# Patient Record
Sex: FEMALE | Race: WHITE | NOT HISPANIC OR LATINO | Employment: FULL TIME | URBAN - NONMETROPOLITAN AREA
[De-identification: names, ages, dates, MRNs, and addresses within clinical notes are randomized per-mention and may not be internally consistent; named-entity substitution may affect disease eponyms.]

---

## 2016-07-26 LAB — HM PAP SMEAR: NORMAL

## 2017-05-10 ENCOUNTER — OFFICE VISIT (OUTPATIENT)
Dept: OBSTETRICS AND GYNECOLOGY | Facility: CLINIC | Age: 32
End: 2017-05-10

## 2017-05-10 VITALS
WEIGHT: 149 LBS | DIASTOLIC BLOOD PRESSURE: 60 MMHG | HEIGHT: 72 IN | BODY MASS INDEX: 20.18 KG/M2 | SYSTOLIC BLOOD PRESSURE: 110 MMHG

## 2017-05-10 DIAGNOSIS — Z30.09 CONTRACEPTIVE EDUCATION: ICD-10-CM

## 2017-05-10 DIAGNOSIS — R10.2 PELVIC PAIN: Primary | ICD-10-CM

## 2017-05-10 DIAGNOSIS — Z87.410 HISTORY OF CERVICAL DYSPLASIA: ICD-10-CM

## 2017-05-10 PROCEDURE — 99213 OFFICE O/P EST LOW 20 MIN: CPT | Performed by: OBSTETRICS & GYNECOLOGY

## 2017-05-10 RX ORDER — FLUTICASONE PROPIONATE 50 MCG
2 SPRAY, SUSPENSION (ML) NASAL DAILY
COMMUNITY

## 2017-05-10 RX ORDER — CETIRIZINE HYDROCHLORIDE 10 MG/1
10 TABLET ORAL DAILY
COMMUNITY
End: 2020-04-15

## 2017-05-23 DIAGNOSIS — Z87.410 HISTORY OF CERVICAL DYSPLASIA: ICD-10-CM

## 2018-08-30 ENCOUNTER — OFFICE VISIT (OUTPATIENT)
Dept: OBSTETRICS AND GYNECOLOGY | Facility: CLINIC | Age: 33
End: 2018-08-30

## 2018-08-30 VITALS
HEIGHT: 71 IN | BODY MASS INDEX: 22.4 KG/M2 | WEIGHT: 160 LBS | DIASTOLIC BLOOD PRESSURE: 64 MMHG | SYSTOLIC BLOOD PRESSURE: 128 MMHG

## 2018-08-30 DIAGNOSIS — Z01.419 ENCOUNTER FOR GYNECOLOGICAL EXAMINATION WITHOUT ABNORMAL FINDING: Primary | ICD-10-CM

## 2018-08-30 PROCEDURE — 99395 PREV VISIT EST AGE 18-39: CPT | Performed by: OBSTETRICS & GYNECOLOGY

## 2018-08-30 RX ORDER — CEFUROXIME AXETIL 500 MG/1
TABLET ORAL
COMMUNITY
Start: 2018-08-22 | End: 2019-11-05

## 2018-08-30 NOTE — PROGRESS NOTES
Subjective  No chief complaint on file.    Patient is 32 y.o.  here for annual examination.  Pt had last pap last year.  Pt with history of cervical dysplasia s/p LEEP.  Pt has had only 1 pap smear since then.  Pt still not on any contraception.  Pt does not want to be at this time.  Menses are regular.  Pt with no pain or problems.    History  Past Medical History:   Diagnosis Date   • Abnormal Pap smear of cervix 2016    ASCUS HIGH RISK HPV (LIBRADO TAM, KRISTA)   • Abnormal Pap smear of cervix 2010    HIEU 1, LSIL MILD DYSPLASIA (DR ESPARZA)   • Asthma    • Dysplasia of cervix 2016    MODERATE   • History of colposcopy with cervical biopsy 2016    MODERATE DYSPLASIA HIEU 11, HSIL (DR ESPARZA)   • History of colposcopy with cervical biopsy 2010    MILD DYSPLASIA (DR ESPARZA)   • HPV (human papilloma virus) infection    • HSV-1 infection 2010    DR ESPARZA     Current Outpatient Prescriptions on File Prior to Visit   Medication Sig Dispense Refill   • cetirizine (zyrTEC) 10 MG tablet Take 10 mg by mouth Daily.     • fluticasone (FLONASE) 50 MCG/ACT nasal spray 2 sprays into each nostril Daily.     • MULTIPLE VITAMIN PO Take  by mouth.     • PROAIR  (90 BASE) MCG/ACT inhaler inhale 2 puffs by mouth every 6 hours if needed for cough or wheezing  0     No current facility-administered medications on file prior to visit.      No Known Allergies  Past Surgical History:   Procedure Laterality Date   • CERVICAL CONIZATION, LEEP  2016    MILD/MODERATE DYSPLASIA (DR ESPARZA)   • CRYOTHERAPY      DR MARIE     Family History   Problem Relation Age of Onset   • Hypertension Father    • Hyperlipidemia Father    • Breast cancer Mother    • Hypertension Mother    • Hyperlipidemia Mother    • Diabetes Maternal Grandmother      Social History     Social History   • Marital status: Single     Social History Main Topics   • Smoking status: Never Smoker   • Smokeless tobacco: Never Used   • Alcohol use  "No   • Drug use: No   • Sexual activity: Yes     Partners: Male     Birth control/ protection: None     Other Topics Concern   • Not on file     Review of Systems  The following systems were reviewed and negative:  constitution, eyes, ENT, respiratory, cardiovascular, gastrointestinal, genitourinary, integument, breast, hematologic / lymphatic, musculoskeletal, neurological, behavioral/psych, endocrine and allergies / immunologic     Objective  Vitals:    08/30/18 1158   BP: 128/64   Weight: 72.6 kg (160 lb)   Height: 180.3 cm (71\")     Physical Exam:  General Appearance: alert, appears stated age and cooperative  Head: normocephalic, without obvious abnormality and atraumatic  Eyes: lids and lashes normal, conjunctivae and sclerae normal, no icterus, no pallor, corneas clear and PERRLA  Ears: ears appear intact with no abnormalities noted  Nose: nares normal, septum midline, mucosa normal and no drainage  Neck: suppple, trachea midline and no thyromegaly  Lungs: clear to auscultation, respirations regular, respirations even and respirations unlabored  Heart: regular rhythm and normal rate, normal S1, S2, no murmur, gallop, or rubs and no click  Breasts: Examined in supine position  Symmetric without masses or skin dimpling  Nipples normal without inversion, lesions or discharge  There are no palpable axillary nodes  Abdomen: normal bowel sounds, no masses, no hepatomegaly, no splenomegaly, soft non-tender, no guarding and no rebound tenderness  Pelvic: Clinical staff was present for exam  External genitalia:  normal appearance of the external genitalia including Bartholin's and [place]'s glands.  :  urethral meatus normal;  Vaginal:  normal pink mucosa without prolapse or lesions.  Cervix:  normal appearance.  Uterus:  normal size, shape and consistency.  Adnexa:  normal bimanual exam of the adnexa.  Pap smear done and specimen sent using Thin-Prep technique  Extremities: moves extremities well, no edema, no " cyanosis and no redness  Skin: no bleeding, bruising or rash and no lesions noted  Lymph Nodes: no palpable adenopathy  Neuro: CN II-X grossly intact; sensation intact  Psych: normal mood and affect, oriented to person, time and place, thought content organized and appropriate judgment  Lab Review   pap    Imaging   No data reviewed    Assessment/Plan  Problem List Items Addressed This Visit     None      Visit Diagnoses     Encounter for gynecological examination without abnormal finding    -  Primary  Pap was done today.  If she does not receive the results of the Pap within 2 weeks  time, she was instructed to call to find out the results.  I explained to Bel that the recommendations for Pap smear interval in a low risk patient has lengthened to 3 years time if cytology alone normal or  5 years time if both cytology and HPV testing were normal.  I encouraged her to be seen yearly for a full physical exam including breast and pelvic exam even during the off years when PAP's will not be performed.  Pt instructed to call for results.  Plan pending results.    Relevant Orders    Pap IG, Rfx HPV ASCU        Follow up pending pathology   This note was electronically signed.  Lupe Ding M.D.

## 2018-09-12 DIAGNOSIS — Z01.419 ENCOUNTER FOR GYNECOLOGICAL EXAMINATION WITHOUT ABNORMAL FINDING: ICD-10-CM

## 2019-11-05 ENCOUNTER — OFFICE VISIT (OUTPATIENT)
Dept: INTERNAL MEDICINE | Facility: CLINIC | Age: 34
End: 2019-11-05

## 2019-11-05 VITALS
WEIGHT: 160 LBS | TEMPERATURE: 98.6 F | SYSTOLIC BLOOD PRESSURE: 125 MMHG | HEIGHT: 72 IN | DIASTOLIC BLOOD PRESSURE: 79 MMHG | OXYGEN SATURATION: 99 % | BODY MASS INDEX: 21.67 KG/M2 | RESPIRATION RATE: 16 BRPM | HEART RATE: 68 BPM

## 2019-11-05 DIAGNOSIS — J45.20 MILD INTERMITTENT ASTHMA WITHOUT COMPLICATION: ICD-10-CM

## 2019-11-05 DIAGNOSIS — R73.01 IMPAIRED FASTING GLUCOSE: ICD-10-CM

## 2019-11-05 DIAGNOSIS — E53.8 VITAMIN B12 DEFICIENCY: ICD-10-CM

## 2019-11-05 DIAGNOSIS — E55.9 VITAMIN D DEFICIENCY: ICD-10-CM

## 2019-11-05 DIAGNOSIS — E78.2 MIXED HYPERLIPIDEMIA: Primary | ICD-10-CM

## 2019-11-05 DIAGNOSIS — R53.83 MALAISE AND FATIGUE: ICD-10-CM

## 2019-11-05 DIAGNOSIS — Z23 NEED FOR HEPATITIS VACCINATION: ICD-10-CM

## 2019-11-05 DIAGNOSIS — R53.81 MALAISE AND FATIGUE: ICD-10-CM

## 2019-11-05 PROCEDURE — 99204 OFFICE O/P NEW MOD 45 MIN: CPT | Performed by: INTERNAL MEDICINE

## 2019-11-05 RX ORDER — NORGESTIMATE AND ETHINYL ESTRADIOL
1 KIT DAILY
COMMUNITY
Start: 2019-10-18 | End: 2020-01-07

## 2019-11-05 RX ORDER — LAMOTRIGINE 200 MG/1
1 TABLET ORAL DAILY
COMMUNITY
Start: 2019-10-16 | End: 2020-07-09

## 2019-11-05 RX ORDER — MONTELUKAST SODIUM 10 MG/1
1 TABLET ORAL DAILY
COMMUNITY
Start: 2019-10-22 | End: 2019-11-05 | Stop reason: SDUPTHER

## 2019-11-05 RX ORDER — HYDROXYZINE 50 MG/1
50 TABLET, FILM COATED ORAL EVERY 6 HOURS PRN
COMMUNITY
End: 2020-04-22 | Stop reason: ALTCHOICE

## 2019-11-05 RX ORDER — MONTELUKAST SODIUM 10 MG/1
10 TABLET ORAL DAILY
Qty: 90 TABLET | Refills: 3 | Status: SHIPPED | OUTPATIENT
Start: 2019-11-05 | End: 2020-10-29 | Stop reason: SDUPTHER

## 2019-11-05 RX ORDER — CLONIDINE HYDROCHLORIDE 0.1 MG/1
1 TABLET ORAL 2 TIMES DAILY PRN
COMMUNITY
Start: 2019-09-26 | End: 2020-09-08

## 2019-11-05 NOTE — PROGRESS NOTES
Sent reminder letter out to patient for Hepatitis C Genotype and HCV RNA By PCR, Qn Rfx Jeanie tests ordered by Nico on 6/13/18. Will follow-up on or by 7/4/18. Lara Bernard MA 06/20/18    Subjective   Bel Brown is a 34 y.o. female.     Chief Complaint   Patient presents with   • Establish Care   • Hyperlipidemia   • Blood Sugar Problem   • Asthma       History of Present Illness   Patient is here for initial visit, she is here to follow-up on her cholesterol and sugar and due for she also complains of asthma and is currently using Advair she had to be seen at the blood work, urgent care recently, she also complains of fatigue, she is due for hepatitis A vaccination, she is a current smoker    Review of Systems   Constitutional: Negative for appetite change, fatigue and fever.   HENT: Negative for congestion, ear discharge, ear pain, sinus pressure and sore throat.    Eyes: Negative for pain and discharge.   Respiratory: Negative for cough, chest tightness, shortness of breath and wheezing.    Cardiovascular: Negative for chest pain, palpitations and leg swelling.   Gastrointestinal: Negative for abdominal pain, blood in stool, constipation, diarrhea and nausea.   Endocrine: Negative for cold intolerance and heat intolerance.   Genitourinary: Negative for dysuria, flank pain and frequency.   Musculoskeletal: Negative for back pain and joint swelling.   Skin: Negative for color change.   Allergic/Immunologic: Negative for environmental allergies and food allergies.   Neurological: Negative for dizziness, weakness, numbness and headaches.   Hematological: Negative for adenopathy. Does not bruise/bleed easily.   Psychiatric/Behavioral: Negative for behavioral problems and dysphoric mood. The patient is not nervous/anxious.        Past Medical History:   Diagnosis Date   • Abnormal Pap smear of cervix 07/26/2016    ASCUS HIGH RISK HPV (LIBRADO TAM, KRISTA)   • Abnormal Pap smear of cervix 01/27/2010    HIEU 1, LSIL MILD DYSPLASIA (DR ESPARZA)   • Asthma    • Depression    • Dysplasia of cervix 08/2016    MODERATE   • KIYA (generalized anxiety disorder)    • History of colposcopy with cervical biopsy 08/24/2016     MODERATE DYSPLASIA HIEU 11, HSIL (DR ESPARZA)   • History of colposcopy with cervical biopsy 03/17/2010    MILD DYSPLASIA (DR ESPARZA)   • HPV (human papilloma virus) infection    • 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    DR ESPARZA   • Panic disorder        Past Surgical History:   Procedure Laterality Date   • CERVICAL CONIZATION, LEEP  09/28/2016    MILD/MODERATE DYSPLASIA (DR ESPARZA)   • CRYOTHERAPY  2013    DR MARIE   • EYE SURGERY  2002       Family History   Problem Relation Age of Onset   • Hypertension Father    • Hyperlipidemia Father    • Cancer Father         skin   • Breast cancer Mother    • Hypertension Mother    • Hyperlipidemia Mother    • Cancer Mother         breast  and  skin   • Diabetes Maternal Grandmother    • Mental illness Maternal Grandmother    • Diabetes Maternal Uncle    • Cancer Paternal Grandmother         colon        reports that she has been smoking.  She has been smoking about 0.05 packs per day. She has never used smokeless tobacco. She reports that she drinks alcohol. She reports that she does not use drugs.    No Known Allergies        Current Outpatient Medications:   •  ADVAIR DISKUS 250-50 MCG/DOSE DISKUS, Inhale 1 puff 2 (Two) Times a Day., Disp: 3 each, Rfl: 6  •  cetirizine (zyrTEC) 10 MG tablet, Take 10 mg by mouth Daily., Disp: , Rfl:   •  cloNIDine (CATAPRES) 0.1 MG tablet, Take 1 tablet by mouth 2 (Two) Times a Day As Needed., Disp: , Rfl:   •  fluticasone (FLONASE) 50 MCG/ACT nasal spray, 2 sprays into each nostril Daily., Disp: , Rfl:   •  hydrOXYzine (ATARAX) 50 MG tablet, Take 50 mg by mouth Every 6 (Six) Hours As Needed for Itching., Disp: , Rfl:   •  lamoTRIgine (LaMICtal) 200 MG tablet, Take 1 tablet by mouth Daily., Disp: , Rfl:   •  montelukast (SINGULAIR) 10 MG tablet, Take 1 tablet by mouth Daily., Disp: 90 tablet, Rfl: 3  •  MULTIPLE VITAMIN PO, Take  by mouth., Disp: , Rfl:   •  PREVIFEM 0.25-35 MG-MCG per tablet, Take 1 tablet by mouth Daily., Disp: , Rfl:   •  PROAIR HFA  "108 (90 BASE) MCG/ACT inhaler, inhale 2 puffs by mouth every 6 hours if needed for cough or wheezing, Disp: , Rfl: 0  •  vitamin D (ERGOCALCIFEROL) 1.25 MG (05125 UT) capsule capsule, Take 1 capsule by mouth 1 (One) Time Per Week., Disp: 8 capsule, Rfl: 0      Objective   Blood pressure 125/79, pulse 68, temperature 98.6 °F (37 °C), resp. rate 16, height 183.5 cm (72.25\"), weight 72.6 kg (160 lb), SpO2 99 %, not currently breastfeeding.    Physical Exam   Constitutional: She is oriented to person, place, and time. She appears well-developed and well-nourished. No distress.   HENT:   Head: Normocephalic and atraumatic.   Right Ear: External ear normal.   Left Ear: External ear normal.   Nose: Nose normal.   Mouth/Throat: Oropharynx is clear and moist.   Eyes: Conjunctivae and EOM are normal. Pupils are equal, round, and reactive to light.   Neck: Neck supple. No thyromegaly present.   Cardiovascular: Normal rate, regular rhythm and normal heart sounds.   Pulmonary/Chest: Effort normal and breath sounds normal. No respiratory distress.   Abdominal: Soft. Bowel sounds are normal. She exhibits no distension. There is no tenderness. There is no rebound.   Musculoskeletal: Normal range of motion. She exhibits no edema.   Lymphadenopathy:     She has no cervical adenopathy.   Neurological: She is alert and oriented to person, place, and time.   No gross motor or sensory deficits   Skin: Skin is warm. She is not diaphoretic.   Psychiatric: She has a normal mood and affect.   Nursing note and vitals reviewed.      Patient's Body mass index is 21.55 kg/m².       Results for orders placed or performed in visit on 11/05/19   Comprehensive Metabolic Panel   Result Value Ref Range    Glucose 90 65 - 99 mg/dL    BUN 6 6 - 20 mg/dL    Creatinine 0.69 0.57 - 1.00 mg/dL    eGFR Non African Am 97 >60 mL/min/1.73    eGFR African Am 118 >60 mL/min/1.73    BUN/Creatinine Ratio 8.7 7.0 - 25.0    Sodium 140 136 - 145 mmol/L    Potassium " 4.0 3.5 - 5.2 mmol/L    Chloride 101 98 - 107 mmol/L    Total CO2 27.3 22.0 - 29.0 mmol/L    Calcium 9.5 8.6 - 10.5 mg/dL    Total Protein 6.8 6.0 - 8.5 g/dL    Albumin 4.60 3.50 - 5.20 g/dL    Globulin 2.2 gm/dL    A/G Ratio 2.1 g/dL    Total Bilirubin 0.4 0.2 - 1.2 mg/dL    Alkaline Phosphatase 54 39 - 117 U/L    AST (SGOT) 17 1 - 32 U/L    ALT (SGPT) 11 1 - 33 U/L   Lipid Panel   Result Value Ref Range    Total Cholesterol 190 0 - 200 mg/dL    Triglycerides 93 0 - 150 mg/dL    HDL Cholesterol 82 (H) 40 - 60 mg/dL    VLDL Cholesterol 18.6 mg/dL    LDL Cholesterol  89 0 - 100 mg/dL   Hemoglobin A1c   Result Value Ref Range    Hemoglobin A1C 4.50 (L) 4.80 - 5.60 %   TSH   Result Value Ref Range    TSH 1.670 0.270 - 4.200 uIU/mL   Vitamin B12   Result Value Ref Range    Vitamin B-12 589 211 - 946 pg/mL   Vitamin D 25 Hydroxy   Result Value Ref Range    25 Hydroxy, Vitamin D 27.8 (L) 30.0 - 100.0 ng/ml   CBC & Differential   Result Value Ref Range    WBC 5.73 3.40 - 10.80 10*3/mm3    RBC 4.15 3.77 - 5.28 10*6/mm3    Hemoglobin 13.1 12.0 - 15.9 g/dL    Hematocrit 39.2 34.0 - 46.6 %    MCV 94.5 79.0 - 97.0 fL    MCH 31.6 26.6 - 33.0 pg    MCHC 33.4 31.5 - 35.7 g/dL    RDW 11.8 (L) 12.3 - 15.4 %    Platelets 234 140 - 450 10*3/mm3    Neutrophil Rel % 51.2 42.7 - 76.0 %    Lymphocyte Rel % 37.7 19.6 - 45.3 %    Monocyte Rel % 8.6 5.0 - 12.0 %    Eosinophil Rel % 1.6 0.3 - 6.2 %    Basophil Rel % 0.7 0.0 - 1.5 %    Neutrophils Absolute 2.94 1.70 - 7.00 10*3/mm3    Lymphocytes Absolute 2.16 0.70 - 3.10 10*3/mm3    Monocytes Absolute 0.49 0.10 - 0.90 10*3/mm3    Eosinophils Absolute 0.09 0.00 - 0.40 10*3/mm3    Basophils Absolute 0.04 0.00 - 0.20 10*3/mm3    Immature Granulocyte Rel % 0.2 0.0 - 0.5 %    Immature Grans Absolute 0.01 0.00 - 0.05 10*3/mm3    nRBC 0.0 0.0 - 0.2 /100 WBC         Assessment/Plan   Bel was seen today for establish care, hyperlipidemia, blood sugar problem and asthma.    Diagnoses and all orders  for this visit:    Mixed hyperlipidemia  -     CBC & Differential  -     Comprehensive Metabolic Panel  -     Lipid Panel    Impaired fasting glucose  -     Hemoglobin A1c    Mild intermittent asthma without complication    Malaise and fatigue  -     TSH    Vitamin B12 deficiency  -     Vitamin B12    Vitamin D deficiency  -     Vitamin D 25 Hydroxy    Need for hepatitis vaccination  -     hepatitis A (HAVRIX) vaccine 1,440 Units    Other orders  -     montelukast (SINGULAIR) 10 MG tablet; Take 1 tablet by mouth Daily.  -     ADVAIR DISKUS 250-50 MCG/DOSE DISKUS; Inhale 1 puff 2 (Two) Times a Day.    Plan:  1.   mixed hyperlipidemia: will obtain   fasting CMP and lipid panel.  Diet and exercise counseled,    2.  Impaired glucose: will obtain   fasting CMP  and hba1c  , diet and exercise counseled ,   3.  Asthma: We will continue with Advair, trial with Singulair  4.fatigue: We will obtain labs  5.need for hepatitis A vaccine: First dose given in office today         Lupe Walton MD

## 2019-11-06 LAB
25(OH)D3+25(OH)D2 SERPL-MCNC: 27.8 NG/ML (ref 30–100)
ALBUMIN SERPL-MCNC: 4.6 G/DL (ref 3.5–5.2)
ALBUMIN/GLOB SERPL: 2.1 G/DL
ALP SERPL-CCNC: 54 U/L (ref 39–117)
ALT SERPL-CCNC: 11 U/L (ref 1–33)
AST SERPL-CCNC: 17 U/L (ref 1–32)
BASOPHILS # BLD AUTO: 0.04 10*3/MM3 (ref 0–0.2)
BASOPHILS NFR BLD AUTO: 0.7 % (ref 0–1.5)
BILIRUB SERPL-MCNC: 0.4 MG/DL (ref 0.2–1.2)
BUN SERPL-MCNC: 6 MG/DL (ref 6–20)
BUN/CREAT SERPL: 8.7 (ref 7–25)
CALCIUM SERPL-MCNC: 9.5 MG/DL (ref 8.6–10.5)
CHLORIDE SERPL-SCNC: 101 MMOL/L (ref 98–107)
CHOLEST SERPL-MCNC: 190 MG/DL (ref 0–200)
CO2 SERPL-SCNC: 27.3 MMOL/L (ref 22–29)
CREAT SERPL-MCNC: 0.69 MG/DL (ref 0.57–1)
EOSINOPHIL # BLD AUTO: 0.09 10*3/MM3 (ref 0–0.4)
EOSINOPHIL NFR BLD AUTO: 1.6 % (ref 0.3–6.2)
ERYTHROCYTE [DISTWIDTH] IN BLOOD BY AUTOMATED COUNT: 11.8 % (ref 12.3–15.4)
GLOBULIN SER CALC-MCNC: 2.2 GM/DL
GLUCOSE SERPL-MCNC: 90 MG/DL (ref 65–99)
HBA1C MFR BLD: 4.5 % (ref 4.8–5.6)
HCT VFR BLD AUTO: 39.2 % (ref 34–46.6)
HDLC SERPL-MCNC: 82 MG/DL (ref 40–60)
HGB BLD-MCNC: 13.1 G/DL (ref 12–15.9)
IMM GRANULOCYTES # BLD AUTO: 0.01 10*3/MM3 (ref 0–0.05)
IMM GRANULOCYTES NFR BLD AUTO: 0.2 % (ref 0–0.5)
LDLC SERPL CALC-MCNC: 89 MG/DL (ref 0–100)
LYMPHOCYTES # BLD AUTO: 2.16 10*3/MM3 (ref 0.7–3.1)
LYMPHOCYTES NFR BLD AUTO: 37.7 % (ref 19.6–45.3)
MCH RBC QN AUTO: 31.6 PG (ref 26.6–33)
MCHC RBC AUTO-ENTMCNC: 33.4 G/DL (ref 31.5–35.7)
MCV RBC AUTO: 94.5 FL (ref 79–97)
MONOCYTES # BLD AUTO: 0.49 10*3/MM3 (ref 0.1–0.9)
MONOCYTES NFR BLD AUTO: 8.6 % (ref 5–12)
NEUTROPHILS # BLD AUTO: 2.94 10*3/MM3 (ref 1.7–7)
NEUTROPHILS NFR BLD AUTO: 51.2 % (ref 42.7–76)
NRBC BLD AUTO-RTO: 0 /100 WBC (ref 0–0.2)
PLATELET # BLD AUTO: 234 10*3/MM3 (ref 140–450)
POTASSIUM SERPL-SCNC: 4 MMOL/L (ref 3.5–5.2)
PROT SERPL-MCNC: 6.8 G/DL (ref 6–8.5)
RBC # BLD AUTO: 4.15 10*6/MM3 (ref 3.77–5.28)
SODIUM SERPL-SCNC: 140 MMOL/L (ref 136–145)
TRIGL SERPL-MCNC: 93 MG/DL (ref 0–150)
TSH SERPL DL<=0.005 MIU/L-ACNC: 1.67 UIU/ML (ref 0.27–4.2)
VIT B12 SERPL-MCNC: 589 PG/ML (ref 211–946)
VLDLC SERPL CALC-MCNC: 18.6 MG/DL
WBC # BLD AUTO: 5.73 10*3/MM3 (ref 3.4–10.8)

## 2019-11-06 RX ORDER — ERGOCALCIFEROL 1.25 MG/1
50000 CAPSULE ORAL WEEKLY
Qty: 8 CAPSULE | Refills: 0 | Status: SHIPPED | OUTPATIENT
Start: 2019-11-06 | End: 2020-04-15

## 2020-01-06 ENCOUNTER — TELEPHONE (OUTPATIENT)
Dept: INTERNAL MEDICINE | Facility: CLINIC | Age: 35
End: 2020-01-06

## 2020-01-07 ENCOUNTER — OFFICE VISIT (OUTPATIENT)
Dept: OBSTETRICS AND GYNECOLOGY | Facility: CLINIC | Age: 35
End: 2020-01-07

## 2020-01-07 VITALS
DIASTOLIC BLOOD PRESSURE: 80 MMHG | WEIGHT: 159 LBS | BODY MASS INDEX: 21.54 KG/M2 | SYSTOLIC BLOOD PRESSURE: 128 MMHG | HEIGHT: 72 IN

## 2020-01-07 DIAGNOSIS — R93.5 ABNORMAL ULTRASOUND OF ENDOMETRIUM: ICD-10-CM

## 2020-01-07 DIAGNOSIS — N94.10 DYSPAREUNIA IN FEMALE: ICD-10-CM

## 2020-01-07 DIAGNOSIS — N94.6 DYSMENORRHEA: ICD-10-CM

## 2020-01-07 DIAGNOSIS — R10.2 PELVIC PAIN: Primary | ICD-10-CM

## 2020-01-07 DIAGNOSIS — N83.201 RIGHT OVARIAN CYST: ICD-10-CM

## 2020-01-07 PROCEDURE — 99214 OFFICE O/P EST MOD 30 MIN: CPT | Performed by: OBSTETRICS & GYNECOLOGY

## 2020-01-07 RX ORDER — DOXYCYCLINE HYCLATE 100 MG/1
100 CAPSULE ORAL 2 TIMES DAILY
Qty: 20 CAPSULE | Refills: 0 | Status: SHIPPED | OUTPATIENT
Start: 2020-01-07 | End: 2020-01-17

## 2020-01-07 NOTE — PROGRESS NOTES
Subjective  Chief Complaint   Patient presents with   • Pelvic Pain     Patient complains of pelvic pain and painful intercourse.      Patient is 34 y.o.  here for evaluation of pelvic pain as well as dyspareunia.  Patient gives a history of having the onset of dyspareunia in May of last year.  Patient reports having continued pain since that time.  Patient reports it is been more frequent in nature over the last few months.  Patient reports having pain at other times as well.  Pt will have pain postcoital too.  Patient describes the pain as an intense cramping pelvic pain.  The patient denies any postcoital bleeding.  Patient denies any vaginal discharge.  The patient's last menstrual cycle was 2 weeks ago.  The patient had tried to restart oral contraceptives several months ago but reports she was unable to tolerate them secondary to nausea.  Patient has had nausea in the past with oral contraceptives.  Patient is currently not on any contraception.  Patient reports her last menstrual cycle was 2 weeks ago.  Patient has had a previous LEEP procedure in .  Patient had her last Pap smear in 2018 which was normal.  Patient reports her menstrual cycles have been regular.  Patient does report having cramping associated with her menstrual cycles as well.    History  Past Medical History:   Diagnosis Date   • Abnormal Pap smear of cervix 2016    ASCUS HIGH RISK HPV (LIBRADO TAM NP)   • Abnormal Pap smear of cervix 2010    HIEU 1, LSIL MILD DYSPLASIA (DR ESPARZA)   • Asthma    • Depression    • Dysplasia of cervix 2016    MODERATE   • KIYA (generalized anxiety disorder)    • History of colposcopy with cervical biopsy 2016    MODERATE DYSPLASIA HIEU 11, HSIL (DR ESPARZA)   • History of colposcopy with cervical biopsy 2010    MILD DYSPLASIA (DR ESPARZA)   • HPV (human papilloma virus) infection    • HSV-1 infection 2010    DR ESPARZA   • Panic disorder      Current Outpatient Medications on  File Prior to Visit   Medication Sig Dispense Refill   • ADVAIR DISKUS 250-50 MCG/DOSE DISKUS Inhale 1 puff 2 (Two) Times a Day. 3 each 11   • cetirizine (zyrTEC) 10 MG tablet Take 10 mg by mouth Daily.     • cloNIDine (CATAPRES) 0.1 MG tablet Take 1 tablet by mouth 2 (Two) Times a Day As Needed.     • fluticasone (FLONASE) 50 MCG/ACT nasal spray 2 sprays into each nostril Daily.     • hydrOXYzine (ATARAX) 50 MG tablet Take 50 mg by mouth Every 6 (Six) Hours As Needed for Itching.     • lamoTRIgine (LaMICtal) 200 MG tablet Take 1 tablet by mouth Daily.     • montelukast (SINGULAIR) 10 MG tablet Take 1 tablet by mouth Daily. 90 tablet 3   • MULTIPLE VITAMIN PO Take  by mouth.     • PROAIR  (90 BASE) MCG/ACT inhaler inhale 2 puffs by mouth every 6 hours if needed for cough or wheezing  0   • vitamin D (ERGOCALCIFEROL) 1.25 MG (25804 UT) capsule capsule Take 1 capsule by mouth 1 (One) Time Per Week. 8 capsule 0     No current facility-administered medications on file prior to visit.      No Known Allergies  Past Surgical History:   Procedure Laterality Date   • CERVICAL CONIZATION, LEEP  09/28/2016    MILD/MODERATE DYSPLASIA (DR ESPARZA)   • CRYOTHERAPY  2013    DR MARIE   • EYE SURGERY  2002     Family History   Problem Relation Age of Onset   • Hypertension Father    • Hyperlipidemia Father    • Cancer Father         skin   • Breast cancer Mother    • Hypertension Mother    • Hyperlipidemia Mother    • Cancer Mother         breast  and  skin   • Diabetes Maternal Grandmother    • Mental illness Maternal Grandmother    • Diabetes Maternal Uncle    • Cancer Paternal Grandmother         colon     Social History     Socioeconomic History   • Marital status: Single     Spouse name: Not on file   • Number of children: Not on file   • Years of education: Not on file   • Highest education level: Not on file   Tobacco Use   • Smoking status: Current Every Day Smoker     Packs/day: 0.05   • Smokeless tobacco: Never Used  "  Substance and Sexual Activity   • Alcohol use: Yes     Comment: social   • Drug use: No   • Sexual activity: Yes     Partners: Male     Birth control/protection: None     Review of Systems  All systems were reviewed and negative except for:  Genitourinary: postivie for  painful intercourse and pelvic pain     Objective  Vitals:    20 1447   BP: 128/80   Weight: 72.1 kg (159 lb)   Height: 183.5 cm (72.25\")     Physical Exam:  General Appearance: alert, appears stated age and cooperative  Head: normocephalic, without obvious abnormality and atraumatic  Eyes: lids and lashes normal, conjunctivae and sclerae normal, no icterus, no pallor, corneas clear and PERRLA  Ears: ears appear intact with no abnormalities noted  Nose: nares normal, septum midline, mucosa normal and no drainage  Neck: suppple, trachea midline and no thyromegaly  Lungs: clear to auscultation, respirations regular, respirations even and respirations unlabored  Heart: regular rhythm and normal rate, normal S1, S2, no murmur, gallop, or rubs and no click  Breasts: Not performed.  Abdomen: normal bowel sounds, no masses, no hepatomegaly, no splenomegaly, soft non-tender, no guarding and no rebound tenderness  Pelvic: Not performed.  Extremities: moves extremities well, no edema, no cyanosis and no redness  Skin: no bleeding, bruising or rash and no lesions noted  Lymph Nodes: no palpable adenopathy  Neuro: CN II-X grossly intact; sensation intact  Psych: normal mood and affect, oriented to person, time and place, thought content organized and appropriate judgment  Lab Review   No data reviewed    Imaging   Pelvic ultrasound report  Pelvic ultrasound images independantly reviewed; see report as noted    US Non-ob Transvaginal  Bel INGRID Brown  : 1985  MRN: [id number]  Date: 2020    Reason for exam/History:  Pelvic pain    The ultrasound images are reviewed.  The uterus is retroverted in   position.  The uterus appears normal.  There " is a small amount of fluid   noted in the endometrium.  The endometrium measures 11 mms.  The bilateral   ovaries are abnormal in appearance.  The left ovary has small follicles.    The right ovary has a 2.6 cm complex cyst noted.  There is normal vascular   flow noted.  There is scant free fluid noted.    The exam limitations noted:  none    See ultrasound report for measurements and structures identified.    Lupe Ding MD, [facility]  [facility] [place]    Decision to Obtain Medical Records  No    Summary of Medical Records  No    Assessment/Plan  Problem List Items Addressed This Visit     None      Visit Diagnoses     Pelvic pain    -  Primary New  A transvaginal ultrasound was performed today.  The patient's last menstrual cycle was 2 weeks ago.  There is fluid noted in the endometrium.  Patient also has a complex cyst of the right ovary.  A prescription is given for doxycycline as noted.  We will plan empiric treatment for endometritis.  Patient is to follow-up as noted.  Plan pending response to treatment.    Relevant Orders    US Non-ob Transvaginal (Completed)    Dysmenorrhea    New  Patient with dysmenorrhea.  The various options for treatment have been discussed.  Patient has not been able to tolerate oral contraceptives in the past.  Other alternatives have been discussed.  Patient is to follow-up as noted.    Dyspareunia in female    New  Patient with new onset dyspareunia as noted.  Transvaginal ultrasound shows fluid in the endometrium.  Prescriptions given for doxycycline.  Patient is to follow-up as noted.  Plan pending response to treatment.    Abnormal ultrasound of endometrium    New  Patient with fluid in the endometrium is noted today.  Patient is not bleeding.  Patient is to call if she starts her menstrual cycle.  Otherwise patient is to follow-up as noted.    Right ovarian cyst    New  With small complex cyst of the right ovary is noted.  Instructions and precautions are  given.  Patient may repeat scan in 6 to 8 weeks if desired as discussed.        Follow up as discussed/scheduled  Note: Speech recognition transcription software may have been used to dictate portions of this document.  An attempt at proofreading has been made though minor errors in transcription may still be present.  This note was electronically signed.  Lupe Ding M.D.

## 2020-01-29 ENCOUNTER — OFFICE VISIT (OUTPATIENT)
Dept: OBSTETRICS AND GYNECOLOGY | Facility: CLINIC | Age: 35
End: 2020-01-29

## 2020-01-29 VITALS
HEIGHT: 72 IN | DIASTOLIC BLOOD PRESSURE: 62 MMHG | WEIGHT: 162 LBS | BODY MASS INDEX: 21.94 KG/M2 | SYSTOLIC BLOOD PRESSURE: 124 MMHG

## 2020-01-29 DIAGNOSIS — N83.201 CYST OF RIGHT OVARY: ICD-10-CM

## 2020-01-29 DIAGNOSIS — R93.5 ABNORMAL ULTRASOUND OF ENDOMETRIUM: ICD-10-CM

## 2020-01-29 DIAGNOSIS — N94.10 DYSPAREUNIA IN FEMALE: ICD-10-CM

## 2020-01-29 DIAGNOSIS — R10.2 PELVIC PAIN: Primary | ICD-10-CM

## 2020-01-29 DIAGNOSIS — N94.6 DYSMENORRHEA: ICD-10-CM

## 2020-01-29 PROCEDURE — 99214 OFFICE O/P EST MOD 30 MIN: CPT | Performed by: OBSTETRICS & GYNECOLOGY

## 2020-01-29 RX ORDER — NORGESTIMATE AND ETHINYL ESTRADIOL
KIT
COMMUNITY
Start: 2020-01-10 | End: 2020-09-08

## 2020-04-09 ENCOUNTER — TELEPHONE (OUTPATIENT)
Dept: INTERNAL MEDICINE | Facility: CLINIC | Age: 35
End: 2020-04-09

## 2020-04-09 NOTE — TELEPHONE ENCOUNTER
Patient is requesting a call back regarding [place] paperwork that she needs to have filled out for work because she is currently on quarentine for being exposed to covid. Gave her the fax number for office so she will be faxing the paperwork over. Please advise.    Patient can be reached on her home number. [phone]

## 2020-04-09 NOTE — TELEPHONE ENCOUNTER
SHAR  Spoke with Bel, advised her that we could not do FMLA paperwork because she was not seen in our office, she needs to contact whoever put her in  quarantine, pt verbalized understanding

## 2020-04-15 ENCOUNTER — TELEMEDICINE (OUTPATIENT)
Dept: INTERNAL MEDICINE | Facility: CLINIC | Age: 35
End: 2020-04-15

## 2020-04-15 DIAGNOSIS — J45.21 MILD INTERMITTENT ASTHMA WITH ACUTE EXACERBATION: Primary | ICD-10-CM

## 2020-04-15 DIAGNOSIS — R06.02 SHORTNESS OF BREATH: ICD-10-CM

## 2020-04-15 DIAGNOSIS — J20.9 ACUTE BRONCHITIS WITH BRONCHOSPASM: Primary | ICD-10-CM

## 2020-04-15 DIAGNOSIS — J30.1 SEASONAL ALLERGIC RHINITIS DUE TO POLLEN: ICD-10-CM

## 2020-04-15 PROCEDURE — 99214 OFFICE O/P EST MOD 30 MIN: CPT | Performed by: INTERNAL MEDICINE

## 2020-04-15 RX ORDER — IPRATROPIUM BROMIDE AND ALBUTEROL SULFATE 2.5; .5 MG/3ML; MG/3ML
3 SOLUTION RESPIRATORY (INHALATION) EVERY 4 HOURS PRN
Qty: 360 ML | Refills: 5 | Status: SHIPPED | OUTPATIENT
Start: 2020-04-15 | End: 2021-06-09

## 2020-04-15 RX ORDER — AZITHROMYCIN 250 MG/1
TABLET, FILM COATED ORAL
Qty: 6 TABLET | Refills: 0 | Status: SHIPPED | OUTPATIENT
Start: 2020-04-15 | End: 2020-04-22 | Stop reason: ALTCHOICE

## 2020-04-15 RX ORDER — METHYLPREDNISOLONE 4 MG/1
TABLET ORAL
Qty: 21 TABLET | Refills: 0 | Status: SHIPPED | OUTPATIENT
Start: 2020-04-15 | End: 2020-04-22

## 2020-04-15 RX ORDER — BENZONATATE 100 MG/1
100 CAPSULE ORAL 3 TIMES DAILY PRN
Qty: 30 CAPSULE | Refills: 3 | Status: SHIPPED | OUTPATIENT
Start: 2020-04-15 | End: 2020-04-25

## 2020-04-15 RX ORDER — LEVOCETIRIZINE DIHYDROCHLORIDE 5 MG/1
5 TABLET, FILM COATED ORAL EVERY EVENING
Qty: 90 TABLET | Refills: 3 | Status: SHIPPED | OUTPATIENT
Start: 2020-04-15 | End: 2021-03-05

## 2020-04-15 NOTE — PROGRESS NOTES
Subjective   Bel Brown is a 34 y.o. female.     Chief Complaint   Patient presents with   • Cough   • Asthma   • Wheezing   • Allergies   • Shortness of Breath       History of Present Illness   Patient is complaining of cough and wheezing, she has asthma, patient also complains of shortness of breath, she is a current smoker but has not been smoking for the past few days, she is currently on quarantine until April 21, 2020 as she was exposed to a Covid positive patient at work but her covid results were negative, patient denies fever, patient also complains of allergies    The following portions of the patient's history were reviewed and updated as appropriate: allergies, current medications, past family history, past medical history, past social history, past surgical history and problem list.    Review of Systems   Constitutional: Negative for appetite change, fatigue and fever.   HENT: Negative for congestion, ear discharge, ear pain, sinus pressure and sore throat.    Eyes: Negative for pain and discharge.   Respiratory: Positive for cough, shortness of breath and wheezing. Negative for chest tightness.    Cardiovascular: Negative for chest pain, palpitations and leg swelling.   Gastrointestinal: Negative for abdominal pain, blood in stool, constipation, diarrhea and nausea.   Endocrine: Negative for cold intolerance and heat intolerance.   Genitourinary: Negative for dysuria, flank pain and frequency.   Musculoskeletal: Negative for back pain and joint swelling.   Skin: Negative for color change.   Allergic/Immunologic: Positive for environmental allergies. Negative for food allergies.   Neurological: Negative for dizziness, weakness, numbness and headaches.   Hematological: Negative for adenopathy. Does not bruise/bleed easily.   Psychiatric/Behavioral: Negative for behavioral problems and dysphoric mood. The patient is not nervous/anxious.        Other review of systems negative    Current Outpatient  Medications:   •  ADVAIR DISKUS 250-50 MCG/DOSE DISKUS, Inhale 1 puff 2 (Two) Times a Day., Disp: 3 each, Rfl: 11  •  cloNIDine (CATAPRES) 0.1 MG tablet, Take 1 tablet by mouth 2 (Two) Times a Day As Needed., Disp: , Rfl:   •  fluticasone (FLONASE) 50 MCG/ACT nasal spray, 2 sprays into each nostril Daily., Disp: , Rfl:   •  hydrOXYzine (ATARAX) 50 MG tablet, Take 50 mg by mouth Every 6 (Six) Hours As Needed for Itching., Disp: , Rfl:   •  ipratropium-albuterol (DUO-NEB) 0.5-2.5 mg/3 ml nebulizer, Take 3 mL by nebulization Every 4 (Four) Hours As Needed for Wheezing., Disp: 360 mL, Rfl: 5  •  lamoTRIgine (LaMICtal) 200 MG tablet, Take 1 tablet by mouth Daily., Disp: , Rfl:   •  levocetirizine (Xyzal) 5 MG tablet, Take 1 tablet by mouth Every Evening., Disp: 90 tablet, Rfl: 3  •  methylPREDNISolone (MEDROL, PJ,) 4 MG tablet, Take as directed on package instructions., Disp: 21 tablet, Rfl: 0  •  montelukast (SINGULAIR) 10 MG tablet, Take 1 tablet by mouth Daily., Disp: 90 tablet, Rfl: 3  •  MULTIPLE VITAMIN PO, Take  by mouth., Disp: , Rfl:   •  PREVIFEM 0.25-35 MG-MCG per tablet, , Disp: , Rfl:   •  PROAIR  (90 BASE) MCG/ACT inhaler, inhale 2 puffs by mouth every 6 hours if needed for cough or wheezing, Disp: , Rfl: 0  •  vitamin D (ERGOCALCIFEROL) 1.25 MG (87907 UT) capsule capsule, Take 1 capsule by mouth 1 (One) Time Per Week., Disp: 8 capsule, Rfl: 0    Objective     not currently breastfeeding.    Physical Exam  Patient's There is no height or weight on file to calculate BMI.    General appearance: Normocephalic and nontraumatic  HEENT: Appears benign, hearing appears intact  Neck: Appears supple  Respiratory: Effort appears normal  Musculoskeletal: Moving all limbs  CNS: No gross motor or sensory deficits  Psychiatry: Alert and oriented x3  Memory appears intact  Mood and affect appears normal  Insight appears normal    Results for orders placed or performed in visit on 11/05/19   Comprehensive Metabolic  Panel   Result Value Ref Range    Glucose 90 65 - 99 mg/dL    BUN 6 6 - 20 mg/dL    Creatinine 0.69 0.57 - 1.00 mg/dL    eGFR Non African Am 97 >60 mL/min/1.73    eGFR African Am 118 >60 mL/min/1.73    BUN/Creatinine Ratio 8.7 7.0 - 25.0    Sodium 140 136 - 145 mmol/L    Potassium 4.0 3.5 - 5.2 mmol/L    Chloride 101 98 - 107 mmol/L    Total CO2 27.3 22.0 - 29.0 mmol/L    Calcium 9.5 8.6 - 10.5 mg/dL    Total Protein 6.8 6.0 - 8.5 g/dL    Albumin 4.60 3.50 - 5.20 g/dL    Globulin 2.2 gm/dL    A/G Ratio 2.1 g/dL    Total Bilirubin 0.4 0.2 - 1.2 mg/dL    Alkaline Phosphatase 54 39 - 117 U/L    AST (SGOT) 17 1 - 32 U/L    ALT (SGPT) 11 1 - 33 U/L   Lipid Panel   Result Value Ref Range    Total Cholesterol 190 0 - 200 mg/dL    Triglycerides 93 0 - 150 mg/dL    HDL Cholesterol 82 (H) 40 - 60 mg/dL    VLDL Cholesterol 18.6 mg/dL    LDL Cholesterol  89 0 - 100 mg/dL   Hemoglobin A1c   Result Value Ref Range    Hemoglobin A1C 4.50 (L) 4.80 - 5.60 %   TSH   Result Value Ref Range    TSH 1.670 0.270 - 4.200 uIU/mL   Vitamin B12   Result Value Ref Range    Vitamin B-12 589 211 - 946 pg/mL   Vitamin D 25 Hydroxy   Result Value Ref Range    25 Hydroxy, Vitamin D 27.8 (L) 30.0 - 100.0 ng/ml   CBC & Differential   Result Value Ref Range    WBC 5.73 3.40 - 10.80 10*3/mm3    RBC 4.15 3.77 - 5.28 10*6/mm3    Hemoglobin 13.1 12.0 - 15.9 g/dL    Hematocrit 39.2 34.0 - 46.6 %    MCV 94.5 79.0 - 97.0 fL    MCH 31.6 26.6 - 33.0 pg    MCHC 33.4 31.5 - 35.7 g/dL    RDW 11.8 (L) 12.3 - 15.4 %    Platelets 234 140 - 450 10*3/mm3    Neutrophil Rel % 51.2 42.7 - 76.0 %    Lymphocyte Rel % 37.7 19.6 - 45.3 %    Monocyte Rel % 8.6 5.0 - 12.0 %    Eosinophil Rel % 1.6 0.3 - 6.2 %    Basophil Rel % 0.7 0.0 - 1.5 %    Neutrophils Absolute 2.94 1.70 - 7.00 10*3/mm3    Lymphocytes Absolute 2.16 0.70 - 3.10 10*3/mm3    Monocytes Absolute 0.49 0.10 - 0.90 10*3/mm3    Eosinophils Absolute 0.09 0.00 - 0.40 10*3/mm3    Basophils Absolute 0.04 0.00 - 0.20  10*3/mm3    Immature Granulocyte Rel % 0.2 0.0 - 0.5 %    Immature Grans Absolute 0.01 0.00 - 0.05 10*3/mm3    nRBC 0.0 0.0 - 0.2 /100 WBC         Assessment/Plan   Ble was seen today for cough, asthma, wheezing, allergies and shortness of breath.    Diagnoses and all orders for this visit:    Mild intermittent asthma with acute exacerbation    Seasonal allergic rhinitis due to pollen    Shortness of breath    Other orders  -     levocetirizine (Xyzal) 5 MG tablet; Take 1 tablet by mouth Every Evening.  -     methylPREDNISolone (MEDROL, PJ,) 4 MG tablet; Take as directed on package instructions.  -     ipratropium-albuterol (DUO-NEB) 0.5-2.5 mg/3 ml nebulizer; Take 3 mL by nebulization Every 4 (Four) Hours As Needed for Wheezing.      Plan:  1.acute asthma: Will start Medrol Dosepak and duo nebs  2.  Allergic rhinitis: Trial with Xyzal  3.  Shortness of breath: Secondary to above  Patient advised to call the clinic if her symptoms worsen    This was an audio and video enabled telemedicine encounter.         Lupe Walton MD

## 2020-04-22 ENCOUNTER — OFFICE VISIT (OUTPATIENT)
Dept: INTERNAL MEDICINE | Facility: CLINIC | Age: 35
End: 2020-04-22

## 2020-04-22 VITALS
WEIGHT: 150 LBS | OXYGEN SATURATION: 100 % | HEART RATE: 78 BPM | TEMPERATURE: 98.4 F | SYSTOLIC BLOOD PRESSURE: 124 MMHG | BODY MASS INDEX: 20.32 KG/M2 | RESPIRATION RATE: 17 BRPM | HEIGHT: 72 IN | DIASTOLIC BLOOD PRESSURE: 79 MMHG

## 2020-04-22 DIAGNOSIS — J45.21 MILD INTERMITTENT ASTHMA WITH ACUTE EXACERBATION: Primary | ICD-10-CM

## 2020-04-22 DIAGNOSIS — R06.02 SHORTNESS OF BREATH: ICD-10-CM

## 2020-04-22 DIAGNOSIS — J20.9 ACUTE BRONCHITIS WITH BRONCHOSPASM: ICD-10-CM

## 2020-04-22 PROCEDURE — 99214 OFFICE O/P EST MOD 30 MIN: CPT | Performed by: INTERNAL MEDICINE

## 2020-04-22 PROCEDURE — 96372 THER/PROPH/DIAG INJ SC/IM: CPT | Performed by: INTERNAL MEDICINE

## 2020-04-22 RX ORDER — METHYLPREDNISOLONE SODIUM SUCCINATE 125 MG/2ML
125 INJECTION, POWDER, LYOPHILIZED, FOR SOLUTION INTRAMUSCULAR; INTRAVENOUS ONCE
Status: COMPLETED | OUTPATIENT
Start: 2020-04-22 | End: 2020-04-22

## 2020-04-22 RX ADMIN — METHYLPREDNISOLONE SODIUM SUCCINATE 125 MG: 125 INJECTION, POWDER, LYOPHILIZED, FOR SOLUTION INTRAMUSCULAR; INTRAVENOUS at 14:30

## 2020-04-22 NOTE — PROGRESS NOTES
Subjective   Bel Brown is a 34 y.o. female.     Chief Complaint   Patient presents with   • Follow-up     asthma, Covid 19 tested -NEG, ER on 4/19 for shortness of air [facility]   • Asthma   • Cough   • Wheezing   • Shortness of Breath       History of Present Illness   Patient is here complaining of shortness of breath and cough with wheezing, she was initially tested for COVID on April 7 and was noted to be negative, she does have asthma , The patient is here to follow up on ER visit at [facility]   on   4- ,   Hospital er records ,  Lab reports  and Radiology reports have been reviewed  Today and medications reconciled and updated .,  Chest x-ray was noted to be negative she received a Solu-Medrol in the ER, she has FMLA papers and return to work papers, 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, she states she stopped smoking but her boyfriend smokes, she has been using the nebulizer machine and has completed Medrol Dosepak, she states she is coughing up whitish mucus  Answers for HPI/ROS submitted by the patient on 4/21/2020   Shortness of breath  What is the primary reason for your visit?: Shortness of Breath  Chronicity: recurrent  Onset: 1 to 4 weeks ago  Frequency: every few minutes  Progression since onset: gradually worsening  Episode duration: 4 hours  abdominal pain: No  chest pain: No  claudication: No  coryza: No  ear pain: No  fever: No  headaches: No  hemoptysis: No  leg pain: No  leg swelling: No  neck pain: No  orthopnea: Yes  PND: Yes  rash: No  rhinorrhea: No  sore throat: No  sputum production: No  swollen glands: No  syncope: No  vomiting: No  wheezing: Yes  Aggravating factors: emotional upset, occupational exposure, smoke, animal exposure, exercise, odors, URIs, fumes, pollens, weather changes, lying flat  The following portions of the patient's history were reviewed and updated as appropriate: allergies, current medications, past family history, past  medical history, past social history, past surgical history and problem list.    Review of Systems   Constitutional: Negative for appetite change, fatigue and fever.   HENT: Negative for congestion, ear discharge, ear pain, sinus pressure and sore throat.    Eyes: Negative for pain and discharge.   Respiratory: Positive for cough, shortness of breath and wheezing. Negative for chest tightness.    Cardiovascular: Negative for chest pain, palpitations and leg swelling.   Gastrointestinal: Negative for abdominal pain, blood in stool, constipation, diarrhea and nausea.   Endocrine: Negative for cold intolerance and heat intolerance.   Genitourinary: Negative for dysuria, flank pain and frequency.   Musculoskeletal: Negative for back pain and joint swelling.   Skin: Negative for color change.   Allergic/Immunologic: Negative for environmental allergies and food allergies.   Neurological: Negative for dizziness, weakness, numbness and headaches.   Hematological: Negative for adenopathy. Does not bruise/bleed easily.   Psychiatric/Behavioral: Negative for behavioral problems and dysphoric mood. The patient is not nervous/anxious.          Current Outpatient Medications:   •  ADVAIR DISKUS 250-50 MCG/DOSE DISKUS, Inhale 1 puff 2 (Two) Times a Day., Disp: 3 each, Rfl: 11  •  benzonatate (Tessalon Perles) 100 MG capsule, Take 1 capsule by mouth 3 (Three) Times a Day As Needed for Cough for up to 10 days., Disp: 30 capsule, Rfl: 3  •  cloNIDine (CATAPRES) 0.1 MG tablet, Take 1 tablet by mouth 2 (Two) Times a Day As Needed., Disp: , Rfl:   •  fluticasone (FLONASE) 50 MCG/ACT nasal spray, 2 sprays into each nostril Daily., Disp: , Rfl:   •  ipratropium-albuterol (DUO-NEB) 0.5-2.5 mg/3 ml nebulizer, Take 3 mL by nebulization Every 4 (Four) Hours As Needed for Wheezing., Disp: 360 mL, Rfl: 5  •  lamoTRIgine (LaMICtal) 200 MG tablet, Take 1 tablet by mouth Daily., Disp: , Rfl:   •  levocetirizine (Xyzal) 5 MG tablet, Take 1 tablet  "by mouth Every Evening., Disp: 90 tablet, Rfl: 3  •  Magnesium 400 MG capsule, 400 mg., Disp: , Rfl:   •  montelukast (SINGULAIR) 10 MG tablet, Take 1 tablet by mouth Daily., Disp: 90 tablet, Rfl: 3  •  MULTIPLE VITAMIN PO, Take  by mouth., Disp: , Rfl:   •  PREVIFEM 0.25-35 MG-MCG per tablet, , Disp: , Rfl:   •  PROAIR  (90 BASE) MCG/ACT inhaler, inhale 2 puffs by mouth every 6 hours if needed for cough or wheezing, Disp: , Rfl: 0  No current facility-administered medications for this visit.     Objective     Blood pressure 124/79, pulse 78, temperature 98.4 °F (36.9 °C), resp. rate 17, height 183.5 cm (72.24\"), weight 68 kg (150 lb), SpO2 100 %, not currently breastfeeding.    Physical Exam   Constitutional: She is oriented to person, place, and time. She appears well-developed and well-nourished. No distress.   HENT:   Head: Normocephalic and atraumatic.   Right Ear: External ear normal.   Left Ear: External ear normal.   Nose: Nose normal.   Mouth/Throat: Oropharynx is clear and moist.   Eyes: Pupils are equal, round, and reactive to light. Conjunctivae and EOM are normal.   Neck: Neck supple. No thyromegaly present.   Cardiovascular: Normal rate, regular rhythm and normal heart sounds.   Pulmonary/Chest: Effort normal. No respiratory distress. She has wheezes.   Abdominal: Soft. Bowel sounds are normal. She exhibits no distension. There is no tenderness. There is no rebound.   Musculoskeletal: Normal range of motion. She exhibits no edema.   Lymphadenopathy:     She has no cervical adenopathy.   Neurological: She is alert and oriented to person, place, and time.   No gross motor or sensory deficits   Skin: Skin is warm. She is not diaphoretic.   Psychiatric: She has a normal mood and affect.   Nursing note and vitals reviewed.    Patient's Body mass index is 20.21 kg/m². BMI is within normal parameters. No follow-up required..      Results for orders placed or performed in visit on 11/05/19 "   Comprehensive Metabolic Panel   Result Value Ref Range    Glucose 90 65 - 99 mg/dL    BUN 6 6 - 20 mg/dL    Creatinine 0.69 0.57 - 1.00 mg/dL    eGFR Non African Am 97 >60 mL/min/1.73    eGFR African Am 118 >60 mL/min/1.73    BUN/Creatinine Ratio 8.7 7.0 - 25.0    Sodium 140 136 - 145 mmol/L    Potassium 4.0 3.5 - 5.2 mmol/L    Chloride 101 98 - 107 mmol/L    Total CO2 27.3 22.0 - 29.0 mmol/L    Calcium 9.5 8.6 - 10.5 mg/dL    Total Protein 6.8 6.0 - 8.5 g/dL    Albumin 4.60 3.50 - 5.20 g/dL    Globulin 2.2 gm/dL    A/G Ratio 2.1 g/dL    Total Bilirubin 0.4 0.2 - 1.2 mg/dL    Alkaline Phosphatase 54 39 - 117 U/L    AST (SGOT) 17 1 - 32 U/L    ALT (SGPT) 11 1 - 33 U/L   Lipid Panel   Result Value Ref Range    Total Cholesterol 190 0 - 200 mg/dL    Triglycerides 93 0 - 150 mg/dL    HDL Cholesterol 82 (H) 40 - 60 mg/dL    VLDL Cholesterol 18.6 mg/dL    LDL Cholesterol  89 0 - 100 mg/dL   Hemoglobin A1c   Result Value Ref Range    Hemoglobin A1C 4.50 (L) 4.80 - 5.60 %   TSH   Result Value Ref Range    TSH 1.670 0.270 - 4.200 uIU/mL   Vitamin B12   Result Value Ref Range    Vitamin B-12 589 211 - 946 pg/mL   Vitamin D 25 Hydroxy   Result Value Ref Range    25 Hydroxy, Vitamin D 27.8 (L) 30.0 - 100.0 ng/ml   CBC & Differential   Result Value Ref Range    WBC 5.73 3.40 - 10.80 10*3/mm3    RBC 4.15 3.77 - 5.28 10*6/mm3    Hemoglobin 13.1 12.0 - 15.9 g/dL    Hematocrit 39.2 34.0 - 46.6 %    MCV 94.5 79.0 - 97.0 fL    MCH 31.6 26.6 - 33.0 pg    MCHC 33.4 31.5 - 35.7 g/dL    RDW 11.8 (L) 12.3 - 15.4 %    Platelets 234 140 - 450 10*3/mm3    Neutrophil Rel % 51.2 42.7 - 76.0 %    Lymphocyte Rel % 37.7 19.6 - 45.3 %    Monocyte Rel % 8.6 5.0 - 12.0 %    Eosinophil Rel % 1.6 0.3 - 6.2 %    Basophil Rel % 0.7 0.0 - 1.5 %    Neutrophils Absolute 2.94 1.70 - 7.00 10*3/mm3    Lymphocytes Absolute 2.16 0.70 - 3.10 10*3/mm3    Monocytes Absolute 0.49 0.10 - 0.90 10*3/mm3    Eosinophils Absolute 0.09 0.00 - 0.40 10*3/mm3    Basophils  Absolute 0.04 0.00 - 0.20 10*3/mm3    Immature Granulocyte Rel % 0.2 0.0 - 0.5 %    Immature Grans Absolute 0.01 0.00 - 0.05 10*3/mm3    nRBC 0.0 0.0 - 0.2 /100 WBC         Assessment/Plan    Bel was seen today for follow-up, asthma, cough, wheezing and shortness of breath.    Diagnoses and all orders for this visit:    Mild intermittent asthma with acute exacerbation  -     methylPREDNISolone sodium succinate (SOLU-Medrol) injection 125 mg    Acute bronchitis with bronchospasm    Shortness of breath       Plan :   1 acute asthma exacerbation: To continue with nebulizer and inhalers, IM Solu-Medrol given in office today, ER records and chest x-ray reviewed  2 .acute bronchitis: We will start oral antibiotics , IM solumedrol given today ,  use nebulizer q4-6 hrs prn  3.shortness of breath: Secondary to above, will monitor  FMLA papers completed today and released to work given, copy has been made and papers have been scanned into chart and also given to patient           Lupe Walton MD

## 2020-06-03 ENCOUNTER — OFFICE VISIT (OUTPATIENT)
Dept: OBSTETRICS AND GYNECOLOGY | Facility: CLINIC | Age: 35
End: 2020-06-03

## 2020-06-03 VITALS
HEIGHT: 72 IN | DIASTOLIC BLOOD PRESSURE: 60 MMHG | BODY MASS INDEX: 20.45 KG/M2 | WEIGHT: 151 LBS | SYSTOLIC BLOOD PRESSURE: 120 MMHG

## 2020-06-03 DIAGNOSIS — Z87.410 HISTORY OF CERVICAL DYSPLASIA: ICD-10-CM

## 2020-06-03 DIAGNOSIS — Z12.39 ENCOUNTER FOR BREAST CANCER SCREENING OTHER THAN MAMMOGRAM: ICD-10-CM

## 2020-06-03 DIAGNOSIS — Z01.419 ENCOUNTER FOR GYNECOLOGICAL EXAMINATION (GENERAL) (ROUTINE) WITHOUT ABNORMAL FINDINGS: Primary | ICD-10-CM

## 2020-06-03 PROCEDURE — 99395 PREV VISIT EST AGE 18-39: CPT | Performed by: OBSTETRICS & GYNECOLOGY

## 2020-06-03 NOTE — PROGRESS NOTES
Subjective  Chief Complaint   Patient presents with   • Gynecologic Exam     Patient is 34 y.o.  here for her annual examination.  Patient's last Pap smear was in 2018.  Patient does have a history of previous cervical dysplasia.  Patient had a LEEP procedure in 2016.  Patient is currently on oral contraceptives.  Patient reports her menstrual cycles are regular.  Patient reports they are light in nature.  Patient denies any changes in her health or medications.  Patient does not need a refill on her oral contraceptives at this time.    History  Past Medical History:   Diagnosis Date   • Abnormal Pap smear of cervix 2016    ASCUS HIGH RISK HPV (LIBRADO TAM NP)   • Abnormal Pap smear of cervix 2010    HIEU 1, LSIL MILD DYSPLASIA (DR ESPARZA)   • Asthma    • Depression    • Dysplasia of cervix 2016    MODERATE   • KIYA (generalized anxiety disorder)    • History of colposcopy with cervical biopsy 2016    MODERATE DYSPLASIA HIEU 11, HSIL (DR ESPARZA)   • History of colposcopy with cervical biopsy 2010    MILD DYSPLASIA (DR ESPARZA)   • HPV (human papilloma virus) infection    • HSV-1 infection 2010    DR ESPARZA   • Panic disorder      Current Outpatient Medications on File Prior to Visit   Medication Sig Dispense Refill   • ADVAIR DISKUS 250-50 MCG/DOSE DISKUS Inhale 1 puff 2 (Two) Times a Day. 3 each 11   • cloNIDine (CATAPRES) 0.1 MG tablet Take 1 tablet by mouth 2 (Two) Times a Day As Needed.     • fluticasone (FLONASE) 50 MCG/ACT nasal spray 2 sprays into each nostril Daily.     • ipratropium-albuterol (DUO-NEB) 0.5-2.5 mg/3 ml nebulizer Take 3 mL by nebulization Every 4 (Four) Hours As Needed for Wheezing. 360 mL 5   • lamoTRIgine (LaMICtal) 200 MG tablet Take 1 tablet by mouth Daily.     • levocetirizine (Xyzal) 5 MG tablet Take 1 tablet by mouth Every Evening. 90 tablet 3   • montelukast (SINGULAIR) 10 MG tablet Take 1 tablet by mouth Daily. 90 tablet 3   • MULTIPLE VITAMIN PO Take  by  "mouth.     • PREVIFEM 0.25-35 MG-MCG per tablet      • PROAIR  (90 BASE) MCG/ACT inhaler inhale 2 puffs by mouth every 6 hours if needed for cough or wheezing  0     No current facility-administered medications on file prior to visit.      No Known Allergies  Past Surgical History:   Procedure Laterality Date   • CERVICAL CONIZATION, LEEP  09/28/2016    MILD/MODERATE DYSPLASIA (DR ESPARZA)   • CRYOTHERAPY  2013    DR MARIE   • EYE SURGERY  2002     Family History   Problem Relation Age of Onset   • Hypertension Father    • Hyperlipidemia Father    • Cancer Father         skin   • Breast cancer Mother    • Hypertension Mother    • Hyperlipidemia Mother    • Cancer Mother         breast  and  skin   • Diabetes Maternal Grandmother    • Mental illness Maternal Grandmother    • Diabetes Maternal Uncle    • Cancer Paternal Grandmother         colon     Social History     Socioeconomic History   • Marital status: Single     Spouse name: Not on file   • Number of children: Not on file   • Years of education: Not on file   • Highest education level: Not on file   Tobacco Use   • Smoking status: Current Every Day Smoker     Packs/day: 0.05   • Smokeless tobacco: Never Used   Substance and Sexual Activity   • Alcohol use: Yes     Comment: social   • Drug use: No   • Sexual activity: Yes     Partners: Male     Birth control/protection: OCP     Review of Systems  The following systems were reviewed and negative:  constitution, eyes, ENT, respiratory, cardiovascular, gastrointestinal, genitourinary, integument, breast, hematologic / lymphatic, musculoskeletal, neurological, behavioral/psych, endocrine and allergies / immunologic     Objective  Vitals:    06/03/20 1015   BP: 120/60   Weight: 68.5 kg (151 lb)   Height: 183.5 cm (72.25\")     Physical Exam:  General Appearance: alert, appears stated age and cooperative  Head: normocephalic, without obvious abnormality and atraumatic  Eyes: lids and lashes normal, conjunctivae " and sclerae normal, no icterus, no pallor, corneas clear and PERRLA  Ears: ears appear intact with no abnormalities noted  Nose: nares normal, septum midline, mucosa normal and no drainage  Neck: suppple, trachea midline and no thyromegaly  Lungs: clear to auscultation, respirations regular, respirations even and respirations unlabored  Heart: regular rhythm and normal rate, normal S1, S2, no murmur, gallop, or rubs and no click  Breasts: Examined in supine position  Symmetric without masses or skin dimpling  Nipples normal without inversion, lesions or discharge  There are no palpable axillary nodes  Abdomen: normal bowel sounds, no masses, no hepatomegaly, no splenomegaly, soft non-tender, no guarding and no rebound tenderness  Pelvic: Clinical staff was present for exam  External genitalia:  normal appearance of the external genitalia including Bartholin's and Shiro's glands.  :  urethral meatus normal;  Vaginal:  normal pink mucosa without prolapse or lesions.  Cervix:  normal appearance.  Uterus:  normal size, shape and consistency.  Adnexa:  normal bimanual exam of the adnexa.  Pap smear done and specimen sent using Thin-Prep technique  Extremities: moves extremities well, no edema, no cyanosis and no redness  Skin: no bleeding, bruising or rash and no lesions noted  Lymph Nodes: no palpable adenopathy  Neuro: CN II-X grossly intact; sensation intact  Psych: normal mood and affect, oriented to person, time and place, thought content organized and appropriate judgment  Lab Review   No data reviewed    Imaging   No data reviewed    Decision to Obtain Medical Records  No    Summary of Medical Records  No    Assessment/Plan  Problem List Items Addressed This Visit     None      Visit Diagnoses     Encounter for gynecological examination (general) (routine) without abnormal findings    -  Primary  Pap was done today.  If she does not receive the results of the Pap within 2 weeks  time, she was instructed to call  to find out the results.  I explained to Bel that the recommendations for Pap smear interval in a low risk patient has lengthened to 3 years time if cytology alone normal or  5 years time if both cytology and HPV testing were normal.  I encouraged her to be seen yearly for a full physical exam including breast and pelvic exam even during the off years when PAP's will not be performed.    Relevant Orders    Pap IG, Rfx HPV ASCU    History of cervical dysplasia      Pap performed as noted.  Instructions precautions are given.    Relevant Orders    Pap IG, Rfx HPV ASCU    Encounter for breast cancer screening other than mammogram      Bel was counseled regarding having clinical breast exams and breast self-awareness.  Women aged 29-39 years of age should have clinical breast exams every 1-3 years and yearly aged 40 and older.  The patient was counseled regarding breast self-awareness focusing on having a sense of what is normal for her breasts so that she can tell if there are changes.  Even small changes should be reported to provider.        Follow up as discussed/scheduled  Note: Speech recognition transcription software may have been used to dictate portions of this document.  An attempt at proofreading has been made though minor errors in transcription may still be present.  This note was electronically signed.  Lupe Ding M.D.

## 2020-06-17 DIAGNOSIS — Z87.410 HISTORY OF CERVICAL DYSPLASIA: ICD-10-CM

## 2020-06-17 DIAGNOSIS — Z01.419 ENCOUNTER FOR GYNECOLOGICAL EXAMINATION (GENERAL) (ROUTINE) WITHOUT ABNORMAL FINDINGS: ICD-10-CM

## 2020-07-09 ENCOUNTER — INITIAL PRENATAL (OUTPATIENT)
Dept: OBSTETRICS AND GYNECOLOGY | Facility: CLINIC | Age: 35
End: 2020-07-09

## 2020-07-09 VITALS — DIASTOLIC BLOOD PRESSURE: 60 MMHG | BODY MASS INDEX: 20.47 KG/M2 | WEIGHT: 152 LBS | SYSTOLIC BLOOD PRESSURE: 122 MMHG

## 2020-07-09 DIAGNOSIS — Z34.01 ENCOUNTER FOR SUPERVISION OF NORMAL FIRST PREGNANCY IN FIRST TRIMESTER: ICD-10-CM

## 2020-07-09 DIAGNOSIS — O36.80X0 ENCOUNTER TO DETERMINE FETAL VIABILITY OF PREGNANCY, SINGLE OR UNSPECIFIED FETUS: Primary | ICD-10-CM

## 2020-07-09 DIAGNOSIS — Z13.79 ENCOUNTER FOR OTHER SCREENING FOR GENETIC AND CHROMOSOMAL ANOMALIES: ICD-10-CM

## 2020-07-09 PROCEDURE — 0501F PRENATAL FLOW SHEET: CPT | Performed by: MIDWIFE

## 2020-07-09 NOTE — PROGRESS NOTES
Subjective     Chief Complaint   Patient presents with   • Routine Prenatal Visit     LMP 20, 10w1d by scan today, last pap 6/3/20, wants to discuss genetic testing        Bel Brown is a 34 y.o. .  Patient's last menstrual period was 2020 (approximate)..  She presents to be seen to initiate prenatal care with our practice. She has a history of anxiety and panic attacks and was taking Lamictal daily and Clonidine. She was weaned off of Lamictal but continues to take Clonidine BID PRN. She states she had tried numerous meds for anxiety/panic including SSRI and SNRIs    Past Medical History:   Diagnosis Date   • Abnormal Pap smear of cervix 2016    ASCUS HIGH RISK HPV (LIBRADO TAM NP)   • Abnormal Pap smear of cervix 2010    HIEU 1, LSIL MILD DYSPLASIA (DR ESPARZA)   • Asthma    • Chlamydia    • Depression    • Dysplasia of cervix 2016    MODERATE   • KIYA (generalized anxiety disorder)    • Gonorrhea    • History of colposcopy with cervical biopsy 2016    MODERATE DYSPLASIA HIEU 11, HSIL (DR ESPARZA)   • History of colposcopy with cervical biopsy 2010    MILD DYSPLASIA (DR ESPARZA)   • HPV (human papilloma virus) infection    • HSV-1 infection 2010    DR ESPARZA   • Panic disorder      No Known Allergies  Social History     Socioeconomic History   • Marital status: Single     Spouse name: Not on file   • Number of children: Not on file   • Years of education: Not on file   • Highest education level: Not on file   Tobacco Use   • Smoking status: Current Every Day Smoker     Packs/day: 0.05   • Smokeless tobacco: Never Used   Substance and Sexual Activity   • Alcohol use: Yes     Comment: social   • Drug use: No   • Sexual activity: Yes     Partners: Male     Family History   Problem Relation Age of Onset   • Hypertension Father    • Hyperlipidemia Father    • Cancer Father         skin   • Breast cancer Mother    • Hypertension Mother    • Hyperlipidemia Mother    • Cancer Mother          breast  and  skin   • Diabetes Maternal Grandmother    • Mental illness Maternal Grandmother    • Diabetes Maternal Uncle    • Cancer Paternal Grandmother         colon         The following portions of the patient's history were reviewed and updated as appropriate:vital signs, allergies, current medications, past medical history, past social history, past surgical history and problem list.    Review of Systems -   /60   Wt 68.9 kg (152 lb)   LMP 05/06/2020 (Approximate)   BMI 20.47 kg/m²   Gastrointestinal: minimal nausea and vomiting, + constipation   Genitourinary: Frequency - denies urgency or burning with urination  All other systems reviewed and are negative    Objective     Physical Exam  Constitutional   The patient is alert, well developed & well nourished.   Neck   The neck is supple and the trachea is midline. The thyroid is not enlarged and there are no palpable nodules.   Breast   Inspection of the breast reveals symmetrical. The nipples are everted. No masses palpated  Respiratory  The patient is relaxed and breathes without effort. Lungs CTAB  Cardiovascular  Regular rate and rhythm without murmur -  Negative LE pitting edema  Gastrointestinal   The abdomen is soft and non tender. No hepatosplenomegaly  Genitourinary   - External Genitalia without erythema, lesions, or masses  -Vagina - There is no abnormal vaginal discharge. Clitoral piercing  -Cervix   Negative cervical motion tenderness   Uterus - uterine body size is approximate to dates  Adnexa structures are without masses  Perineum is without inflammation or lesion  Skin  Normal color. No rashes or lesions  Extremities  Full ROM. No rashes or edema  Psychiatric  The patient is oriented to person, place, and time. Speech is fluent and words are clear    Imaging   Pelvic ultrasound report  10w1d, + FHT      Assessment/Plan     ASSESSMENT  1. IUP at 10w1d   2. Low risk pregnancy  3. First trimester discomforts of pregnancy, nausea,  breast tenderness, constipation.   4. Hx of panic attacks    PLAN  1. Tests ordered today:  Orders Placed This Encounter   Procedures   • NuSwab VG+ - Swab, Vagina   • US Ob Transvaginal     Order Specific Question:   Reason for Exam:     Answer:   dates, NOB   • OB Panel With HIV   • Thyroid Panel With TSH     2. Medications prescribed today:  No orders of the defined types were placed in this encounter.    3. Information reviewed: smoking in pregnancy, exercise in pregnancy, nutrition in pregnancy, weight gain in pregnancy, work and travel restrictions during pregnancy, list of OTC medications acceptable in pregnancy and call coverage groups  4. Genetic testing reviewed: Cystic Fibrosis Screen  5. Melisa products, Vit B6 supp, Unisom, or seabands PRN  6. Consulted with Dr Ding, stop Clonidine      Follow up: 4 week(s)         This note was electronically signed.    Linda Wood CNM  7/9/2020

## 2020-07-10 ENCOUNTER — RESULTS ENCOUNTER (OUTPATIENT)
Dept: OBSTETRICS AND GYNECOLOGY | Facility: CLINIC | Age: 35
End: 2020-07-10

## 2020-07-10 DIAGNOSIS — Z34.01 ENCOUNTER FOR SUPERVISION OF NORMAL FIRST PREGNANCY IN FIRST TRIMESTER: Primary | ICD-10-CM

## 2020-07-10 DIAGNOSIS — Z34.01 ENCOUNTER FOR SUPERVISION OF NORMAL FIRST PREGNANCY IN FIRST TRIMESTER: ICD-10-CM

## 2020-07-15 LAB
CFDNA.FET/CFDNA.TOTAL SFR FETUS: NORMAL %
CITATION REF LAB TEST: NORMAL
FET 13+18+21+X+Y ANEUP PLAS.CFDNA: NEGATIVE
FET CHR 21 TS PLAS.CFDNA QL: NEGATIVE
FET MS X RISK WBC.DNA+CFDNA QL: NOT DETECTED
FET SEX PLAS.CFDNA DOSAGE CFDNA: NORMAL
FET TS 13 RISK PLAS.CFDNA QL: NEGATIVE
FET TS 18 RISK WBC.DNA+CFDNA QL: NEGATIVE
FET X + Y ANEUP RISK PLAS.CFDNA SEQ-IMP: NOT DETECTED
GA EST FROM CONCEPTION DATE: NORMAL D
GESTATIONAL AGE > 9:: YES
LAB DIRECTOR NAME PROVIDER: NORMAL
LAB DIRECTOR NAME PROVIDER: NORMAL
LABORATORY COMMENT REPORT: NORMAL
LIMITATIONS OF THE TEST: NORMAL
NEGATIVE PREDICTIVE VALUE: NORMAL
NOTE: NORMAL
PERFORMANCE CHARACTERISTICS: NORMAL
POSITIVE PREDICTIVE VALUE: NORMAL
REF LAB TEST METHOD: NORMAL
TEST PERFORMANCE INFO SPEC: NORMAL

## 2020-07-20 LAB
A VAGINAE DNA VAG QL NAA+PROBE: ABNORMAL SCORE
ABO GROUP BLD: ABNORMAL
BASOPHILS # BLD AUTO: 0 X10E3/UL (ref 0–0.2)
BASOPHILS NFR BLD AUTO: 1 %
BLD GP AB SCN SERPL QL: NEGATIVE
BVAB2 DNA VAG QL NAA+PROBE: ABNORMAL SCORE
C ALBICANS DNA VAG QL NAA+PROBE: POSITIVE
C GLABRATA DNA VAG QL NAA+PROBE: NEGATIVE
C TRACH DNA VAG QL NAA+PROBE: NEGATIVE
EOSINOPHIL # BLD AUTO: 0.1 X10E3/UL (ref 0–0.4)
EOSINOPHIL NFR BLD AUTO: 1 %
ERYTHROCYTE [DISTWIDTH] IN BLOOD BY AUTOMATED COUNT: 11.7 % (ref 11.7–15.4)
FT4I SERPL CALC-MCNC: 2 (ref 1.2–4.9)
HBV SURFACE AG SERPL QL IA: NEGATIVE
HCT VFR BLD AUTO: 35.8 % (ref 34–46.6)
HCV AB S/CO SERPL IA: <0.1 S/CO RATIO (ref 0–0.9)
HGB BLD-MCNC: 12.1 G/DL (ref 11.1–15.9)
HIV 1+2 AB+HIV1 P24 AG SERPL QL IA: NON REACTIVE
IMM GRANULOCYTES # BLD AUTO: 0 X10E3/UL (ref 0–0.1)
IMM GRANULOCYTES NFR BLD AUTO: 0 %
LYMPHOCYTES # BLD AUTO: 2.4 X10E3/UL (ref 0.7–3.1)
LYMPHOCYTES NFR BLD AUTO: 35 %
MCH RBC QN AUTO: 32.5 PG (ref 26.6–33)
MCHC RBC AUTO-ENTMCNC: 33.8 G/DL (ref 31.5–35.7)
MCV RBC AUTO: 96 FL (ref 79–97)
MEGA1 DNA VAG QL NAA+PROBE: ABNORMAL SCORE
MONOCYTES # BLD AUTO: 0.7 X10E3/UL (ref 0.1–0.9)
MONOCYTES NFR BLD AUTO: 11 %
N GONORRHOEA DNA VAG QL NAA+PROBE: NEGATIVE
NEUTROPHILS # BLD AUTO: 3.6 X10E3/UL (ref 1.4–7)
NEUTROPHILS NFR BLD AUTO: 52 %
PLATELET # BLD AUTO: 231 X10E3/UL (ref 150–450)
RBC # BLD AUTO: 3.72 X10E6/UL (ref 3.77–5.28)
RH BLD: POSITIVE
RPR SER QL: NON REACTIVE
RUBV IGG SERPL IA-ACNC: 1.89 INDEX
T VAGINALIS DNA VAG QL NAA+PROBE: NEGATIVE
T3RU NFR SERPL: 23 % (ref 24–39)
T4 SERPL-MCNC: 8.9 UG/DL (ref 4.5–12)
TSH SERPL DL<=0.005 MIU/L-ACNC: 3.45 UIU/ML (ref 0.45–4.5)
WBC # BLD AUTO: 6.8 X10E3/UL (ref 3.4–10.8)

## 2020-08-10 ENCOUNTER — ROUTINE PRENATAL (OUTPATIENT)
Dept: OBSTETRICS AND GYNECOLOGY | Facility: CLINIC | Age: 35
End: 2020-08-10

## 2020-08-10 VITALS — WEIGHT: 161 LBS | DIASTOLIC BLOOD PRESSURE: 60 MMHG | SYSTOLIC BLOOD PRESSURE: 122 MMHG | BODY MASS INDEX: 21.68 KG/M2

## 2020-08-10 DIAGNOSIS — Z34.92 NORMAL PREGNANCY, SECOND TRIMESTER: Primary | ICD-10-CM

## 2020-08-10 PROCEDURE — 0502F SUBSEQUENT PRENATAL CARE: CPT | Performed by: NURSE PRACTITIONER

## 2020-08-10 NOTE — PROGRESS NOTES
[address]  Chief Complaint   Patient presents with   • Routine Prenatal Visit     no complaints         HPI  Bel is a  currently at 14w5d who today reports the following:    Doing well     Contractions - No;   Leaking - No;   Vaginal bleeding -  No;   Swelling of extremities - No.       ROS:        GI: Nausea - No;   Constipation - No;   Diarrhea - No    Neuro: Headache - No;   Visual change - No    Pertinent items are noted in HPI, all other systems reviewed and negative     Social History     Socioeconomic History   • Marital status: Single     Spouse name: Not on file   • Number of children: Not on file   • Years of education: Not on file   • Highest education level: Not on file   Tobacco Use   • Smoking status: Current Every Day Smoker     Packs/day: 0.05   • Smokeless tobacco: Never Used   Substance and Sexual Activity   • Alcohol use: Yes     Comment: social   • Drug use: No   • Sexual activity: Yes     Partners: Male          EXAM  /60   Wt 73 kg (161 lb)   LMP 2020 (Approximate)   BMI 21.68 kg/m²  -See Prenatal Assessment  General Appearance:  Pleasant  Lungs: Breathing unlabored  Abdomen:  See flow sheet for Fundal ht, FM, FHT's  LE: Neg edema  V/E: Not performed     Lab Results   Component Value Date    ABO O 2020    RH Positive 2020    ABSCRN Negative 2020       MDM  Impression: Supervision of normal pregnancy   Tests done today: none   Topics discussed: Nutririon reviewed  encouraged questions - call prn    Tests next visit: U/S          Current Outpatient Medications:   •  ADVAIR DISKUS 250-50 MCG/DOSE DISKUS, Inhale 1 puff 2 (Two) Times a Day., Disp: 3 each, Rfl: 11  •  cloNIDine (CATAPRES) 0.1 MG tablet, Take 1 tablet by mouth 2 (Two) Times a Day As Needed., Disp: , Rfl:   •  fluticasone (FLONASE) 50 MCG/ACT nasal spray, 2 sprays into each nostril Daily., Disp: , Rfl:   •  ipratropium-albuterol (DUO-NEB) 0.5-2.5 mg/3 ml nebulizer, Take 3 mL by nebulization Every 4  (Four) Hours As Needed for Wheezing., Disp: 360 mL, Rfl: 5  •  levocetirizine (Xyzal) 5 MG tablet, Take 1 tablet by mouth Every Evening., Disp: 90 tablet, Rfl: 3  •  montelukast (SINGULAIR) 10 MG tablet, Take 1 tablet by mouth Daily., Disp: 90 tablet, Rfl: 3  •  MULTIPLE VITAMIN PO, Take  by mouth., Disp: , Rfl:   •  PREVIFEM 0.25-35 MG-MCG per tablet, , Disp: , Rfl:   •  PROAIR  (90 BASE) MCG/ACT inhaler, inhale 2 puffs by mouth every 6 hours if needed for cough or wheezing, Disp: , Rfl: 0    Past Surgical History:   Procedure Laterality Date   • CERVICAL BIOPSY  W/ LOOP ELECTRODE EXCISION     • CERVICAL CONIZATION, LEEP  2016    MILD/MODERATE DYSPLASIA (DR ESPARZA)   • CRYOTHERAPY      DR MARIE   • EYE SURGERY         OB History        1    Para   0    Term   0       0    AB   0    Living   0       SAB   0    TAB   0    Ectopic   0    Molar        Multiple   0    Live Births                    Past Medical History:   Diagnosis Date   • Abnormal Pap smear of cervix 2016    ASCUS HIGH RISK HPV (LIBRADO TAM, KRISTA)   • Abnormal Pap smear of cervix 2010    HIEU 1, LSIL MILD DYSPLASIA (DR ESPARZA)   • Asthma    • Chlamydia    • Depression    • Dysplasia of cervix 2016    MODERATE   • KIYA (generalized anxiety disorder)    • Gonorrhea    • History of colposcopy with cervical biopsy 2016    MODERATE DYSPLASIA HIEU 11, HSIL (DR ESPARZA)   • History of colposcopy with cervical biopsy 2010    MILD DYSPLASIA (DR ESPARZA)   • HPV (human papilloma virus) infection    • HSV-1 infection 2010    DR ESPARZA   • Panic disorder        Family History   Problem Relation Age of Onset   • Hypertension Father    • Hyperlipidemia Father    • Cancer Father         skin   • Breast cancer Mother    • Hypertension Mother    • Hyperlipidemia Mother    • Cancer Mother         breast  and  skin   • Diabetes Maternal Grandmother    • Mental illness Maternal Grandmother    • Diabetes Maternal Uncle    •  Cancer Paternal Grandmother         colon

## 2020-09-08 ENCOUNTER — ROUTINE PRENATAL (OUTPATIENT)
Dept: OBSTETRICS AND GYNECOLOGY | Facility: CLINIC | Age: 35
End: 2020-09-08

## 2020-09-08 VITALS — BODY MASS INDEX: 21.82 KG/M2 | DIASTOLIC BLOOD PRESSURE: 90 MMHG | WEIGHT: 162 LBS | SYSTOLIC BLOOD PRESSURE: 144 MMHG

## 2020-09-08 DIAGNOSIS — F41.0 PANIC DISORDER: ICD-10-CM

## 2020-09-08 DIAGNOSIS — Z34.92 PRENATAL CARE IN SECOND TRIMESTER: Primary | ICD-10-CM

## 2020-09-08 DIAGNOSIS — J45.20 MILD INTERMITTENT ASTHMA WITHOUT COMPLICATION: ICD-10-CM

## 2020-09-08 DIAGNOSIS — Z36.89 ENCOUNTER FOR FETAL ANATOMIC SURVEY: ICD-10-CM

## 2020-09-08 PROCEDURE — 0502F SUBSEQUENT PRENATAL CARE: CPT | Performed by: OBSTETRICS & GYNECOLOGY

## 2020-09-08 NOTE — PROGRESS NOTES
Chief Complaint   Patient presents with   • Routine Prenatal Visit     Anatomy scan done today, no complaints.       HPI:   Bel is a  currently at 18w6d who today reports the following:  Contractions - No; Leaking - No; Vaginal bleeding -  No; Swelling of extremities - No. Good fetal movement - YES.    ROS:  GI: Nausea - No; Constipation - No; Diarrhea - No. RUQ pain - No    Neuro: Headache - No; Visual disturbances - No.    Pertinent items are noted in HPI, all other systems reviewed and negative    Review of History:  The following portions of the patient's history were reviewed and updated as appropriate:problem list, current medications, allergies, past family history, past medical history, past social history and past surgical history.    Current Outpatient Medications on File Prior to Visit   Medication Sig Dispense Refill   • ADVAIR DISKUS 250-50 MCG/DOSE DISKUS Inhale 1 puff 2 (Two) Times a Day. 3 each 11   • fluticasone (FLONASE) 50 MCG/ACT nasal spray 2 sprays into each nostril Daily.     • ipratropium-albuterol (DUO-NEB) 0.5-2.5 mg/3 ml nebulizer Take 3 mL by nebulization Every 4 (Four) Hours As Needed for Wheezing. 360 mL 5   • levocetirizine (Xyzal) 5 MG tablet Take 1 tablet by mouth Every Evening. 90 tablet 3   • montelukast (SINGULAIR) 10 MG tablet Take 1 tablet by mouth Daily. 90 tablet 3   • MULTIPLE VITAMIN PO Take  by mouth.     • PROAIR  (90 BASE) MCG/ACT inhaler inhale 2 puffs by mouth every 6 hours if needed for cough or wheezing  0   • [DISCONTINUED] cloNIDine (CATAPRES) 0.1 MG tablet Take 1 tablet by mouth 2 (Two) Times a Day As Needed.     • [DISCONTINUED] PREVIFEM 0.25-35 MG-MCG per tablet        No current facility-administered medications on file prior to visit.        EXAM:  /90   Wt 73.5 kg (162 lb)   LMP 2020 (Approximate)   BMI 21.82 kg/m²     Gen: NAD, conversant  Pulm: No use of accessory muscles, normal respirations  Abdomen: Gravid, nontender, size =  dates, + fetal cardiac activity  Ext: no edema, no rashes, WWP  Gait: normal for pregnancy  Psych: Mood, insight, judgement intact  SVE: Not performed     Lab Results   Component Value Date    ABO O 07/09/2020    RH Positive 07/09/2020    ABSCRN Negative 07/09/2020       Social History    Tobacco Use      Smoking status: Current Every Day Smoker        Packs/day: 0.05      Smokeless tobacco: Never Used      I have reviewed the prenatal labs and previous ultrasounds today.    MDM:  Diagnosis: Supervision of high risk pregnancy   Asthma  Seasonal allergies  Panic disorder   Tests/Orders/Rx today: Orders Placed This Encounter   Procedures   • US Ob 14 + Weeks Single or First Gestation     Order Specific Question:   Reason for Exam:     Answer:   anatomy scan     Meds Ordered: none   Topics discussed: Prenatal care milestones   Asthma  Mood stable  U/S findings   Tests next visit: none   Next visit: 4 week(s)     Louie Oconnor MD  Obstetrics and Gynecology  [facility]

## 2020-10-05 ENCOUNTER — ROUTINE PRENATAL (OUTPATIENT)
Dept: OBSTETRICS AND GYNECOLOGY | Facility: CLINIC | Age: 35
End: 2020-10-05

## 2020-10-05 VITALS — DIASTOLIC BLOOD PRESSURE: 62 MMHG | BODY MASS INDEX: 23.03 KG/M2 | WEIGHT: 171 LBS | SYSTOLIC BLOOD PRESSURE: 118 MMHG

## 2020-10-05 DIAGNOSIS — F41.0 PANIC DISORDER: ICD-10-CM

## 2020-10-05 DIAGNOSIS — J45.20 MILD INTERMITTENT ASTHMA WITHOUT COMPLICATION: ICD-10-CM

## 2020-10-05 DIAGNOSIS — O09.522 MULTIGRAVIDA OF ADVANCED MATERNAL AGE IN SECOND TRIMESTER: ICD-10-CM

## 2020-10-05 DIAGNOSIS — O09.92 ENCOUNTER FOR SUPERVISION OF HIGH RISK PREGNANCY IN SECOND TRIMESTER, ANTEPARTUM: Primary | ICD-10-CM

## 2020-10-05 PROCEDURE — 0502F SUBSEQUENT PRENATAL CARE: CPT | Performed by: OBSTETRICS & GYNECOLOGY

## 2020-10-05 NOTE — PROGRESS NOTES
Chief Complaint   Patient presents with   • Routine Prenatal Visit     No complaints.        HPI: Bel is a  currently at 22w5d who today reports the following:  Contractions - No; Leaking - No; Vaginal bleeding -  No; Heartburn - No.  The patient is without complaints.  She does report good fetal movement.  Patient denies any cramping or contractions.  Patient has been working 5 hours daily 8-hour shifts.  Patient works night shift at [employer].  The patient does have a history of asthma.  She has not had any recent flareups.  The patient also has a history of anxiety and panic disorder.  She is not on any medications at this time.  ROS:   GI:   Nausea - No; Constipation - No; Diarrhea - No.   Neuro:  Headache - No; Visual disturbances - No.    EXAM:   Vitals:  See prenatal flowsheet as noted and reviewed   General: Alert, cooperative, and does not appear in any distress   Lungs:  Respirations regular, even and unlabored   Abdomen:   See prenatal flowsheet as noted and reviewed     Uterus gravid, non-tender; no palpable masses     No guarding or rebound tenderness   Pelvic:  See prenatal flowsheet as noted and reviewed   Ext:  See prenatal flowsheet as noted and reviewed     Moves extremities well, no cyanosis and no redness   Skin:  No bleeding, bruising or rash and no lesions noted   Psych:  Normal mood and affect, oriented and thought content appropriate   Urine:  See prenatal flowsheet as noted and reviewed    Prenatal Labs  Lab Results   Component Value Date    HGB 12.1 2020    RUBELLAABIGG 1.89 2020    HEPBSAG Negative 2020    ABO O 2020    RH Positive 2020    ABSCRN Negative 2020    PJH0HSN1 Non Reactive 2020    HEPCVIRUSABY <0.1 2020       No visits with results within 1 Month(s) from this visit.   Latest known visit with results is:   Initial Prenatal on 2020   Component Date Value   • Hepatitis B Surface Ag 2020 Negative    • Hep C  Virus Ab 07/09/2020 <0.1    • RPR 07/09/2020 Non Reactive    • Rubella Antibodies, IgG 07/09/2020 1.89    • ABO Type 07/09/2020 O    • Rh Factor 07/09/2020 Positive    • Antibody Screen 07/09/2020 Negative    • HIV Screen 4th Gen w/RFX* 07/09/2020 Non Reactive    • WBC 07/09/2020 6.8    • RBC 07/09/2020 3.72*   • Hemoglobin 07/09/2020 12.1    • Hematocrit 07/09/2020 35.8    • MCV 07/09/2020 96    • MCH 07/09/2020 32.5    • MCHC 07/09/2020 33.8    • RDW 07/09/2020 11.7    • Platelets 07/09/2020 231    • Neutrophil Rel % 07/09/2020 52    • Lymphocyte Rel % 07/09/2020 35    • Monocyte Rel % 07/09/2020 11    • Eosinophil Rel % 07/09/2020 1    • Basophil Rel % 07/09/2020 1    • Neutrophils Absolute 07/09/2020 3.6    • Lymphocytes Absolute 07/09/2020 2.4    • Monocytes Absolute 07/09/2020 0.7    • Eosinophils Absolute 07/09/2020 0.1    • Basophils Absolute 07/09/2020 0.0    • Immature Granulocyte Rel* 07/09/2020 0    • Immature Grans Absolute 07/09/2020 0.0    • TSH 07/09/2020 3.450    • T4, Total 07/09/2020 8.9    • T3 Uptake 07/09/2020 23*   • Free Thyroxine Index 07/09/2020 2.0    • Atopobium Vaginae 07/09/2020 Low - 0    • BVAB 2 07/09/2020 Low - 0    • Megasphaera 1 07/09/2020 Low - 0    • Allie Albicans, EMERSON 07/09/2020 Positive*   • Allie Glabrata, EMERSON 07/09/2020 Negative    • Trichomonas vaginosis 07/09/2020 Negative    • Chlamydia trachomatis, N* 07/09/2020 Negative    • Neisseria gonorrhoeae, N* 07/09/2020 Negative    • Gestation 07/10/2020 [place]    • Fetal Fraction 07/10/2020 8%    • Gestational Age >9: 07/10/2020 Yes    • Result 07/10/2020 Negative    •  Comments 07/10/2020 Comment    • Approved By 07/10/2020 Comment    • TRISOMY 21 (DOWN SYNDROM* 07/10/2020 Negative    • TRISOMY 18 (DAVIES SYND* 07/10/2020 Negative    • TRISOMY 13 (PATAU SYNDRO* 07/10/2020 Negative    • FETAL SEX 07/10/2020 Comment    • MONOSOMY X (ROTH SYNDR* 07/10/2020 Not Detected    • XYY (MCKEE SYNDROME) 07/10/2020  Not Detected    • XXY (KLINEFELTER SYNDROM* 07/10/2020 Not Detected    • XXX (TRIPLE X SYNDROME) 07/10/2020 Not Detected    • NEGATIVE PREDICTIVE VALUE 07/10/2020 Note    • POSITIVE PREDICTIVE VALUE 07/10/2020 N/A    • About The Test 07/10/2020 Comment    • Test Method 07/10/2020 Comment    • Performance 07/10/2020 Comment    • PERFORMANCE CHARACTERIST* 07/10/2020 Note    • Limitations of the Test 07/10/2020 Comment    • Note 07/10/2020 Comment    • References 07/10/2020 Comment        Imaging Results  US Ob 14 + Weeks Single or First Gestation  Impression:   IUP at 18+6 weeks. Anatomy was completely cleared today and all organ   systems were found to be normal in appearance. EFW 291g(76%) AC 71%.   Cephalic presentation. Placenta is fundal. Amniotic fluid and fetal heart   rate are normal.    Louie Oconnor MD  Obstetrics and Gynecology  [facility]       MDM:  Impression: Supervision of high risk pregnancy   AMA - normal genetic screening  Asthma  History of anxiety/panic   Tests done today: none   Topics discussed: kick counts and fetal movement  PIH precautions   labor signs and symptoms   Tests next visit: CBC  GTT     This note was electronically signed.  Lupe Ding M.D.

## 2020-10-29 RX ORDER — MONTELUKAST SODIUM 10 MG/1
10 TABLET ORAL DAILY
Qty: 90 TABLET | Refills: 2 | Status: SHIPPED | OUTPATIENT
Start: 2020-10-29 | End: 2021-03-05

## 2020-11-02 ENCOUNTER — ROUTINE PRENATAL (OUTPATIENT)
Dept: OBSTETRICS AND GYNECOLOGY | Facility: CLINIC | Age: 35
End: 2020-11-02

## 2020-11-02 VITALS — DIASTOLIC BLOOD PRESSURE: 78 MMHG | WEIGHT: 177 LBS | BODY MASS INDEX: 23.84 KG/M2 | SYSTOLIC BLOOD PRESSURE: 120 MMHG

## 2020-11-02 DIAGNOSIS — Z34.92 PRENATAL CARE IN SECOND TRIMESTER: Primary | ICD-10-CM

## 2020-11-02 DIAGNOSIS — Z3A.26 26 WEEKS GESTATION OF PREGNANCY: ICD-10-CM

## 2020-11-02 DIAGNOSIS — O09.522 MULTIGRAVIDA OF ADVANCED MATERNAL AGE IN SECOND TRIMESTER: ICD-10-CM

## 2020-11-02 LAB
BASOPHILS # BLD AUTO: 0.04 10*3/MM3 (ref 0–0.2)
BASOPHILS NFR BLD AUTO: 0.4 % (ref 0–1.5)
EOSINOPHIL # BLD AUTO: 0.07 10*3/MM3 (ref 0–0.4)
EOSINOPHIL NFR BLD AUTO: 0.7 % (ref 0.3–6.2)
ERYTHROCYTE [DISTWIDTH] IN BLOOD BY AUTOMATED COUNT: 11.8 % (ref 12.3–15.4)
GLUCOSE 1H P 50 G GLC PO SERPL-MCNC: 127 MG/DL (ref 65–139)
HCT VFR BLD AUTO: 32.7 % (ref 34–46.6)
HGB BLD-MCNC: 11.5 G/DL (ref 12–15.9)
IMM GRANULOCYTES # BLD AUTO: 0.04 10*3/MM3 (ref 0–0.05)
IMM GRANULOCYTES NFR BLD AUTO: 0.4 % (ref 0–0.5)
LYMPHOCYTES # BLD AUTO: 1.8 10*3/MM3 (ref 0.7–3.1)
LYMPHOCYTES NFR BLD AUTO: 18.3 % (ref 19.6–45.3)
MCH RBC QN AUTO: 32.8 PG (ref 26.6–33)
MCHC RBC AUTO-ENTMCNC: 35.2 G/DL (ref 31.5–35.7)
MCV RBC AUTO: 93.2 FL (ref 79–97)
MONOCYTES # BLD AUTO: 0.6 10*3/MM3 (ref 0.1–0.9)
MONOCYTES NFR BLD AUTO: 6.1 % (ref 5–12)
NEUTROPHILS # BLD AUTO: 7.31 10*3/MM3 (ref 1.7–7)
NEUTROPHILS NFR BLD AUTO: 74.1 % (ref 42.7–76)
NRBC BLD AUTO-RTO: 0 /100 WBC (ref 0–0.2)
PLATELET # BLD AUTO: 234 10*3/MM3 (ref 140–450)
RBC # BLD AUTO: 3.51 10*6/MM3 (ref 3.77–5.28)
WBC # BLD AUTO: 9.86 10*3/MM3 (ref 3.4–10.8)

## 2020-11-02 PROCEDURE — 0502F SUBSEQUENT PRENATAL CARE: CPT | Performed by: OBSTETRICS & GYNECOLOGY

## 2020-11-02 NOTE — PROGRESS NOTES
Chief Complaint   Patient presents with   • Routine Prenatal Visit     Glucola done today, no complaints.       HPI:   Bel is a  currently at 26w5d who today reports the following:  Contractions - No; Leaking - No; Vaginal bleeding -  No; Swelling of extremities - No. Good fetal movement - YES.    ROS:  GI: Nausea - No; Constipation - No; Diarrhea - No. RUQ pain - No    Neuro: Headache - No; Visual disturbances - No.    Pertinent items are noted in HPI, all other systems reviewed and negative    Review of History:  The following portions of the patient's history were reviewed and updated as appropriate:problem list, current medications, allergies, past family history, past medical history, past social history and past surgical history.    Current Outpatient Medications on File Prior to Visit   Medication Sig Dispense Refill   • ADVAIR DISKUS 250-50 MCG/DOSE DISKUS Inhale 1 puff 2 (Two) Times a Day. 3 each 11   • fluticasone (FLONASE) 50 MCG/ACT nasal spray 2 sprays into each nostril Daily.     • ipratropium-albuterol (DUO-NEB) 0.5-2.5 mg/3 ml nebulizer Take 3 mL by nebulization Every 4 (Four) Hours As Needed for Wheezing. 360 mL 5   • levocetirizine (Xyzal) 5 MG tablet Take 1 tablet by mouth Every Evening. 90 tablet 3   • montelukast (SINGULAIR) 10 MG tablet Take 1 tablet by mouth Daily. 90 tablet 2   • MULTIPLE VITAMIN PO Take  by mouth.     • PROAIR  (90 BASE) MCG/ACT inhaler inhale 2 puffs by mouth every 6 hours if needed for cough or wheezing  0     No current facility-administered medications on file prior to visit.        EXAM:  /78   Wt 80.3 kg (177 lb)   LMP 2020 (Approximate)   BMI 23.84 kg/m²     Gen: NAD, conversant  Pulm: No use of accessory muscles, normal respirations  Abdomen: Gravid, nontender, size = dates, + fetal cardiac activity  Ext: no edema, no rashes, WWP  Gait: normal for pregnancy  Psych: Mood, insight, judgement intact  SVE: Not performed     Lab Results    Component Value Date    ABO O 07/09/2020    RH Positive 07/09/2020    ABSCRN Negative 07/09/2020       Social History    Tobacco Use      Smoking status: Current Every Day Smoker        Packs/day: 0.05        Types: Cigarettes      Smokeless tobacco: Never Used      I have reviewed the prenatal labs and previous ultrasounds today.    MDM:  Diagnosis: Supervision of high risk pregnancy   AMA  Asthma  Seasonal allergies  Panic disorder   Tests/Orders/Rx today: Orders Placed This Encounter   Procedures   • Gestational Screen 1 Hr ([facility])   • CBC & Differential     Order Specific Question:   Manual Differential     Answer:   No     Meds Ordered: none   Topics discussed: Prenatal care milestones   Asthma  Mood stable  NIPS NEG   Tests next visit: none   Next visit: 3 week(s)     Louie Oconnor MD  Obstetrics and Gynecology  [facility]

## 2020-11-10 ENCOUNTER — OFFICE VISIT (OUTPATIENT)
Dept: INTERNAL MEDICINE | Facility: CLINIC | Age: 35
End: 2020-11-10

## 2020-11-10 VITALS
DIASTOLIC BLOOD PRESSURE: 67 MMHG | OXYGEN SATURATION: 97 % | BODY MASS INDEX: 24.11 KG/M2 | HEART RATE: 89 BPM | SYSTOLIC BLOOD PRESSURE: 122 MMHG | WEIGHT: 178 LBS | RESPIRATION RATE: 16 BRPM | TEMPERATURE: 97.8 F | HEIGHT: 72 IN

## 2020-11-10 DIAGNOSIS — Z33.1 PREGNANCY, INCIDENTAL: ICD-10-CM

## 2020-11-10 DIAGNOSIS — Z00.00 ROUTINE GENERAL MEDICAL EXAMINATION AT A HEALTH CARE FACILITY: Primary | ICD-10-CM

## 2020-11-10 PROCEDURE — 99395 PREV VISIT EST AGE 18-39: CPT | Performed by: INTERNAL MEDICINE

## 2020-11-10 NOTE — PATIENT INSTRUCTIONS
MyPlate from USDA    MyPlate is an outline of a general healthy diet based on the 2010 Dietary Guidelines for Americans, from the U.S. Department of Agriculture (USDA). It sets guidelines for how much food you should eat from each food group based on your age, sex, and level of physical activity.  What are tips for following MyPlate?  To follow MyPlate recommendations:  · Eat a wide variety of fruits and vegetables, grains, and protein foods.  · Serve smaller portions and eat less food throughout the day.  · Limit portion sizes to avoid overeating.  · Enjoy your food.  · Get at least 150 minutes of exercise every week. This is about 30 minutes each day, 5 or more days per week.  It can be difficult to have every meal look like MyPlate. Think about MyPlate as eating guidelines for an entire day, rather than each individual meal.  Fruits and vegetables  · Make half of your plate fruits and vegetables.  · Eat many different colors of fruits and vegetables each day.  · For a 2,000 calorie daily food plan, eat:  ? 2½ cups of vegetables every day.  ? 2 cups of fruit every day.  · 1 cup is equal to:  ? 1 cup raw or cooked vegetables.  ? 1 cup raw fruit.  ? 1 medium-sized orange, apple, or banana.  ? 1 cup 100% fruit or vegetable juice.  ? 2 cups raw leafy greens, such as lettuce, spinach, or kale.  ? ½ cup dried fruit.  Grains  · One fourth of your plate should be grains.  · Make at least half of the grains you eat each day whole grains.  · For a 2,000 calorie daily food plan, eat 6 oz of grains every day.  · 1 oz is equal to:  ? 1 slice bread.  ? 1 cup cereal.  ? ½ cup cooked rice, cereal, or pasta.  Protein  · One fourth of your plate should be protein.  · Eat a wide variety of protein foods, including meat, poultry, fish, eggs, beans, nuts, and tofu.  · For a 2,000 calorie daily food plan, eat 5½ oz of protein every day.  · 1 oz is equal to:  ? 1 oz meat, poultry, or fish.  ? ¼ cup cooked beans.  ? 1 egg.  ? ½ oz nuts  or seeds.  ? 1 Tbsp peanut butter.  Dairy  · Drink fat-free or low-fat (1%) milk.  · Eat or drink dairy as a side to meals.  · For a 2,000 calorie daily food plan, eat or drink 3 cups of dairy every day.  · 1 cup is equal to:  ? 1 cup milk, yogurt, cottage cheese, or soy milk (soy beverage).  ? 2 oz processed cheese.  ? 1½ oz natural cheese.  Fats, oils, salt, and sugars  · Only small amounts of oils are recommended.  · Avoid foods that are high in calories and low in nutritional value (empty calories), like foods high in fat or added sugars.  · Choose foods that are low in salt (sodium). Choose foods that have less than 140 milligrams (mg) of sodium per serving.  · Drink water instead of sugary drinks. Drink enough water each day to keep your urine pale yellow.  Where to find support  · Work with your health care provider or a nutrition specialist (dietitian) to develop a customized eating plan that is right for you.  · Download an michela (mobile application) to help you track your daily food intake.  Where to find more information  · Go to ChooseMyPlate.gov for more information.  Summary  · MyPlate is a general guideline for healthy eating from the USDA. It is based on the 2010 Dietary Guidelines for Americans.  · In general, fruits and vegetables should take up ½ of your plate, grains should take up ¼ of your plate, and protein should take up ¼ of your plate.  This information is not intended to replace advice given to you by your health care provider. Make sure you discuss any questions you have with your health care provider.  Document Released: 01/06/2009 Document Revised: 05/21/2020 Document Reviewed: 03/19/2018  Elsevier Patient Education © 2020 [employer]

## 2020-11-10 NOTE — PROGRESS NOTES
Subjective   Ble Brown is a 35 y.o. female.     Chief Complaint   Patient presents with   • Annual Exam       History of Present Illness   Patient is here for a physical examination, she is 28 weeks pregnant and follows up with gynecology and had lab work done which has been reviewed, she received a flu shot and has quit smoking    Review of Systems   Constitutional: Negative for appetite change, fatigue and fever.   HENT: Negative for congestion, ear discharge, ear pain, sinus pressure and sore throat.    Eyes: Negative for pain and discharge.   Respiratory: Negative for cough, chest tightness, shortness of breath and wheezing.    Cardiovascular: Negative for chest pain, palpitations and leg swelling.   Gastrointestinal: Negative for abdominal pain, blood in stool, constipation, diarrhea and nausea.   Endocrine: Negative for cold intolerance and heat intolerance.   Genitourinary: Negative for dysuria, flank pain and frequency.   Musculoskeletal: Negative for back pain and joint swelling.   Skin: Negative for color change.   Allergic/Immunologic: Negative for environmental allergies and food allergies.   Neurological: Negative for dizziness, weakness, numbness and headaches.   Hematological: Negative for adenopathy. Does not bruise/bleed easily.   Psychiatric/Behavioral: Negative for behavioral problems and dysphoric mood. The patient is not nervous/anxious.        Past Medical History:   Diagnosis Date   • Abnormal Pap smear of cervix 07/26/2016    ASCUS HIGH RISK HPV (LIBRADO TAM NP)   • Abnormal Pap smear of cervix 01/27/2010    HIEU 1, LSIL MILD DYSPLASIA (DR ESPARZA)   • Asthma    • Chlamydia    • Depression    • Dysplasia of cervix 08/2016    MODERATE   • KIYA (generalized anxiety disorder)    • Gonorrhea    • History of colposcopy with cervical biopsy 08/24/2016    MODERATE DYSPLASIA HIEU 11, HSIL (DR ESPARZA)   • History of colposcopy with cervical biopsy 03/17/2010    MILD DYSPLASIA (DR ESPARZA)   • HPV (human  "papilloma virus) infection    • 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    DR ESPARZA   • Panic disorder        Past Surgical History:   Procedure Laterality Date   • CERVICAL BIOPSY  W/ LOOP ELECTRODE EXCISION     • CERVICAL CONIZATION, LEEP  09/28/2016    MILD/MODERATE DYSPLASIA (DR ESPARZA)   • CRYOTHERAPY  2013    DR MARIE   • EYE SURGERY  2002       Family History   Problem Relation Age of Onset   • Hypertension Father    • Hyperlipidemia Father    • Cancer Father         skin   • Breast cancer Mother    • Hypertension Mother    • Hyperlipidemia Mother    • Cancer Mother         breast  and  skin   • Diabetes Maternal Grandmother    • Mental illness Maternal Grandmother    • Diabetes Maternal Uncle    • Cancer Paternal Grandmother         colon        reports that she quit smoking about 6 months ago. Her smoking use included cigarettes. She smoked 0.05 packs per day. She has never used smokeless tobacco. She reports previous alcohol use. She reports that she does not use drugs.    No Known Allergies        Current Outpatient Medications:   •  ADVAIR DISKUS 250-50 MCG/DOSE DISKUS, Inhale 1 puff 2 (Two) Times a Day., Disp: 3 each, Rfl: 11  •  fluticasone (FLONASE) 50 MCG/ACT nasal spray, 2 sprays into each nostril Daily., Disp: , Rfl:   •  ipratropium-albuterol (DUO-NEB) 0.5-2.5 mg/3 ml nebulizer, Take 3 mL by nebulization Every 4 (Four) Hours As Needed for Wheezing., Disp: 360 mL, Rfl: 5  •  levocetirizine (Xyzal) 5 MG tablet, Take 1 tablet by mouth Every Evening., Disp: 90 tablet, Rfl: 3  •  montelukast (SINGULAIR) 10 MG tablet, Take 1 tablet by mouth Daily., Disp: 90 tablet, Rfl: 2  •  MULTIPLE VITAMIN PO, Take  by mouth., Disp: , Rfl:   •  PROAIR  (90 BASE) MCG/ACT inhaler, inhale 2 puffs by mouth every 6 hours if needed for cough or wheezing, Disp: , Rfl: 0      Objective   Blood pressure 122/67, pulse 89, temperature 97.8 °F (36.6 °C), resp. rate 16, height 182.9 cm (72\"), weight 80.7 kg (178 lb), last menstrual " period 05/06/2020, SpO2 97 %, not currently breastfeeding.    Physical Exam  Vitals signs and nursing note reviewed.   Constitutional:       General: She is not in acute distress.     Appearance: Normal appearance. She is well-developed. She is not diaphoretic.   HENT:      Head: Normocephalic and atraumatic.      Right Ear: External ear normal.      Left Ear: External ear normal.      Nose: Nose normal.   Eyes:      Extraocular Movements: Extraocular movements intact.      Conjunctiva/sclera: Conjunctivae normal.   Neck:      Musculoskeletal: Neck supple.      Trachea: Trachea normal.   Cardiovascular:      Rate and Rhythm: Normal rate and regular rhythm.      Heart sounds: Normal heart sounds.   Pulmonary:      Effort: Pulmonary effort is normal. No respiratory distress.      Breath sounds: Normal breath sounds.   Abdominal:      General: Abdomen is flat.   Musculoskeletal:      Comments: Moves all limbs   Skin:     General: Skin is warm.   Neurological:      Mental Status: She is alert and oriented to person, place, and time.      Comments: No gross motor or sensory deficits         Patient's Body mass index is 24.14 kg/m². BMI is within normal parameters. No follow-up required..      Results for orders placed or performed in visit on 11/02/20   Gestational Screen 1 Hr ([facility])    Specimen: Blood   Result Value Ref Range    Gestational Diabetes Screen 127 65 - 139 mg/dL   CBC & Differential    Specimen: Blood   Result Value Ref Range    WBC 9.86 3.40 - 10.80 10*3/mm3    RBC 3.51 (L) 3.77 - 5.28 10*6/mm3    Hemoglobin 11.5 (L) 12.0 - 15.9 g/dL    Hematocrit 32.7 (L) 34.0 - 46.6 %    MCV 93.2 79.0 - 97.0 fL    MCH 32.8 26.6 - 33.0 pg    MCHC 35.2 31.5 - 35.7 g/dL    RDW 11.8 (L) 12.3 - 15.4 %    Platelets 234 140 - 450 10*3/mm3    Neutrophil Rel % 74.1 42.7 - 76.0 %    Lymphocyte Rel % 18.3 (L) 19.6 - 45.3 %    Monocyte Rel % 6.1 5.0 - 12.0 %    Eosinophil Rel % 0.7 0.3 - 6.2 %    Basophil Rel % 0.4 0.0 - 1.5 %     Neutrophils Absolute 7.31 (H) 1.70 - 7.00 10*3/mm3    Lymphocytes Absolute 1.80 0.70 - 3.10 10*3/mm3    Monocytes Absolute 0.60 0.10 - 0.90 10*3/mm3    Eosinophils Absolute 0.07 0.00 - 0.40 10*3/mm3    Basophils Absolute 0.04 0.00 - 0.20 10*3/mm3    Immature Granulocyte Rel % 0.4 0.0 - 0.5 %    Immature Grans Absolute 0.04 0.00 - 0.05 10*3/mm3    nRBC 0.0 0.0 - 0.2 /100 WBC         Assessment/Plan   Diagnoses and all orders for this visit:    1. Routine general medical examination at a health care facility (Primary)    2. Pregnancy, incidental      plan:  1.physical: Physical exam conducted today,  Will obtain fasting lab work,   Impression: healthy adult Patient. Currently, patient eats a healthy diet. Screening lab work includes glucose, lipid profile , complete blood count and comprehensive panel . The risks and benefits of immunizations were discussed and immunizations are up to date. Advice and education were given regarding nutrition and aerobic exercise. Patient discussion: discussed with the patient.  Physical with preventive exam as well as age and risk appropriate counseling completed.   Patient Discussion: plan discussed with the patient, follow-up visit needed in one year. Medication Review and medication list updated  2.  28 weeks pregnancy: To follow-up with gynecology             Lupe Walton MD

## 2020-11-23 ENCOUNTER — ROUTINE PRENATAL (OUTPATIENT)
Dept: OBSTETRICS AND GYNECOLOGY | Facility: CLINIC | Age: 35
End: 2020-11-23

## 2020-11-23 VITALS — DIASTOLIC BLOOD PRESSURE: 72 MMHG | SYSTOLIC BLOOD PRESSURE: 122 MMHG | WEIGHT: 181 LBS | BODY MASS INDEX: 24.55 KG/M2

## 2020-11-23 DIAGNOSIS — O09.523 MULTIGRAVIDA OF ADVANCED MATERNAL AGE IN THIRD TRIMESTER: ICD-10-CM

## 2020-11-23 DIAGNOSIS — Z34.93 NORMAL PREGNANCY, THIRD TRIMESTER: Primary | ICD-10-CM

## 2020-11-23 PROCEDURE — 0502F SUBSEQUENT PRENATAL CARE: CPT | Performed by: NURSE PRACTITIONER

## 2020-11-23 NOTE — PATIENT INSTRUCTIONS
Labor Information   labor is when labor starts at less than 37 weeks of pregnancy. The normal length of a pregnancy is 39 to 41 weeks.  CAUSES  Often, there is no identifiable underlying cause as to why a woman goes into  labor. One of the most common known causes of  labor is infection. Infections of the uterus, cervix, vagina, amniotic sac, bladder, kidney, or even the lungs (pneumonia) can cause labor to start. Other suspected causes of  labor include:   · Urogenital infections, such as yeast infections and bacterial vaginosis.    · Uterine abnormalities (uterine shape, uterine septum, fibroids, or bleeding from the placenta).    · A cervix that has been operated on (it may fail to stay closed).    · Malformations in the fetus.    · Multiple gestations (twins, triplets, and so on).    · Breakage of the amniotic sac.    RISK FACTORS  · Having a previous history of  labor.    · Having premature rupture of membranes (PROM).    · Having a placenta that covers the opening of the cervix (placenta previa).    · Having a placenta that separates from the uterus (placental abruption).    · Having a cervix that is too weak to hold the fetus in the uterus (incompetent cervix).    · Having too much fluid in the amniotic sac (polyhydramnios).    · Taking illegal drugs or smoking while pregnant.    · Not gaining enough weight while pregnant.    · Being younger than 18 and older than 35 years old.    · Having a low socioeconomic status.    · Being .  SYMPTOMS  Signs and symptoms of  labor include:   · Menstrual-like cramps, abdominal pain, or back pain.  · Uterine contractions that are regular, as frequent as six in an hour, regardless of their intensity (may be mild or painful).  · Contractions that start on the top of the uterus and spread down to the lower abdomen and back.    · A sense of increased pelvic pressure.    · A watery or bloody mucus discharge that  comes from the vagina.    TREATMENT  Depending on the length of the pregnancy and other circumstances, your health care provider may suggest bed rest. If necessary, there are medicines that can be given to stop contractions and to mature the fetal lungs. If labor happens before 34 weeks of pregnancy, a prolonged hospital stay may be recommended. Treatment depends on the condition of both you and the fetus.   WHAT SHOULD YOU DO IF YOU THINK YOU ARE IN  LABOR?  Call your health care provider right away. You will need to go to the hospital to get checked immediately.  HOW CAN YOU PREVENT  LABOR IN FUTURE PREGNANCIES?  You should:   · Stop smoking if you smoke.   · Maintain healthy weight gain and avoid chemicals and drugs that are not necessary.  · Be watchful for any type of infection.  · Inform your health care provider if you have a known history of  labor.     This information is not intended to replace advice given to you by your health care provider. Make sure you discuss any questions you have with your health care provider.give

## 2020-11-23 NOTE — PROGRESS NOTES
[id number]  Chief Complaint   Patient presents with   • Routine Prenatal Visit     No complaints        HPI  Bel is a  currently at 29w5d who today reports the following:  Doing well - good FM  Some d/c - would like to r/o infection      Contractions - No;   Leaking - No;   Vaginal bleeding -  No;   Swelling of extremities - No.       ROS:        GI: Nausea - No;   Constipation - No;   Diarrhea - No    Neuro: Headache - No;   Visual change - No    Pertinent items are noted in HPI, all other systems reviewed and negative     Social History     Tobacco Use   • Smoking status: Former Smoker     Packs/day: 0.05     Types: Cigarettes     Quit date: 2020     Years since quittin.5   • Smokeless tobacco: Never Used   Substance Use Topics   • Alcohol use: Not Currently     Comment: social   • Drug use: No          EXAM  /72   Wt 82.1 kg (181 lb)   LMP 2020 (Approximate)   BMI 24.55 kg/m²  -See Prenatal Assessment  General Appearance:  Pleasant  Lungs: Breathing unlabored  Abdomen:  See flow sheet for Fundal ht, FM, FHT's  LE: Neg edema  V/E: Not performed      Lab Results   Component Value Date    ABO O 2020    RH Positive 2020    ABSCRN Negative 2020       MDM  Impression: Supervision of normal pregnancy   AMA  Desires vaginal culture    Tests done today: nuswab   Topics discussed: continue to note good FM  T-dap   s/s PTL - written info provided  child birth classes encouraged     May call for results on Wed or Thursday   encouraged questions - call prn    Tests next visit: U/S          Current Outpatient Medications:   •  ADVAIR DISKUS 250-50 MCG/DOSE DISKUS, Inhale 1 puff 2 (Two) Times a Day., Disp: 3 each, Rfl: 11  •  fluticasone (FLONASE) 50 MCG/ACT nasal spray, 2 sprays into each nostril Daily., Disp: , Rfl:   •  ipratropium-albuterol (DUO-NEB) 0.5-2.5 mg/3 ml nebulizer, Take 3 mL by nebulization Every 4 (Four) Hours As Needed for Wheezing., Disp: 360 mL, Rfl: 5  •   levocetirizine (Xyzal) 5 MG tablet, Take 1 tablet by mouth Every Evening., Disp: 90 tablet, Rfl: 3  •  montelukast (SINGULAIR) 10 MG tablet, Take 1 tablet by mouth Daily., Disp: 90 tablet, Rfl: 2  •  MULTIPLE VITAMIN PO, Take  by mouth., Disp: , Rfl:   •  PROAIR  (90 BASE) MCG/ACT inhaler, inhale 2 puffs by mouth every 6 hours if needed for cough or wheezing, Disp: , Rfl: 0    Past Surgical History:   Procedure Laterality Date   • CERVICAL BIOPSY  W/ LOOP ELECTRODE EXCISION     • CERVICAL CONIZATION, LEEP  2016    MILD/MODERATE DYSPLASIA (DR ESPARZA)   • CRYOTHERAPY      DR MARIE   • EYE SURGERY         OB History        1    Para   0    Term   0       0    AB   0    Living   0       SAB   0    TAB   0    Ectopic   0    Molar        Multiple   0    Live Births                    Past Medical History:   Diagnosis Date   • Abnormal Pap smear of cervix 2016    ASCUS HIGH RISK HPV (LIBRADO TAM NP)   • Abnormal Pap smear of cervix 2010    HIEU 1, LSIL MILD DYSPLASIA (DR ESPARZA)   • Asthma    • Chlamydia    • Depression    • Dysplasia of cervix 2016    MODERATE   • KIYA (generalized anxiety disorder)    • Gonorrhea    • History of colposcopy with cervical biopsy 2016    MODERATE DYSPLASIA HIEU 11, HSIL (DR ESPARZA)   • History of colposcopy with cervical biopsy 2010    MILD DYSPLASIA (DR ESPARZA)   • HPV (human papilloma virus) infection    • HSV-1 infection 2010    DR ESPARZA   • Panic disorder        Family History   Problem Relation Age of Onset   • Hypertension Father    • Hyperlipidemia Father    • Cancer Father         skin   • Breast cancer Mother    • Hypertension Mother    • Hyperlipidemia Mother    • Cancer Mother         breast  and  skin   • Diabetes Maternal Grandmother    • Mental illness Maternal Grandmother    • Diabetes Maternal Uncle    • Cancer Paternal Grandmother         colon

## 2020-11-26 LAB
A VAGINAE DNA VAG QL NAA+PROBE: ABNORMAL SCORE
BVAB2 DNA VAG QL NAA+PROBE: ABNORMAL SCORE
C ALBICANS DNA VAG QL NAA+PROBE: POSITIVE
C GLABRATA DNA VAG QL NAA+PROBE: NEGATIVE
C TRACH DNA VAG QL NAA+PROBE: NEGATIVE
MEGA1 DNA VAG QL NAA+PROBE: ABNORMAL SCORE
N GONORRHOEA DNA VAG QL NAA+PROBE: NEGATIVE
T VAGINALIS DNA VAG QL NAA+PROBE: NEGATIVE

## 2020-12-09 ENCOUNTER — ROUTINE PRENATAL (OUTPATIENT)
Dept: OBSTETRICS AND GYNECOLOGY | Facility: CLINIC | Age: 35
End: 2020-12-09

## 2020-12-09 VITALS — WEIGHT: 182 LBS | SYSTOLIC BLOOD PRESSURE: 118 MMHG | BODY MASS INDEX: 24.68 KG/M2 | DIASTOLIC BLOOD PRESSURE: 68 MMHG

## 2020-12-09 DIAGNOSIS — Z36.89 ENCOUNTER FOR ULTRASOUND TO CHECK FETAL GROWTH: Primary | ICD-10-CM

## 2020-12-09 PROCEDURE — 0502F SUBSEQUENT PRENATAL CARE: CPT | Performed by: OBSTETRICS & GYNECOLOGY

## 2020-12-09 NOTE — PROGRESS NOTES
Chief Complaint   Patient presents with   • Routine Prenatal Visit     Growth Scan, No Complaints/concerns, Good fetal movement         HPI:   , 32w0d gestation reports doing well    ROS:  See Prenatal Episode/Flowsheet  /68   Wt 82.6 kg (182 lb)   LMP 2020 (Approximate)   BMI 24.68 kg/m²      EXAM:  EXTREMITIES:  No swelling-See Prenatal Episode/Flowsheet    ABDOMEN:  FHTs/Movement noted-See Prenatal Episode/Flowsheet    URINE GLUCOSE/PROTEIN:  See Prenatal Episode/Flowsheet    PELVIC EXAM:  See Prenatal Episode/Flowsheet  CV:  Lungs:  GYN:    MDM:    Lab Results   Component Value Date    HGB 11.5 (L) 2020    RUBELLAABIGG 1.89 2020    HEPBSAG Negative 2020    ABO O 2020    RH Positive 2020    ABSCRN Negative 2020    ICY1OHY5 Non Reactive 2020    HEPCVIRUSABY <0.1 2020       U/S: Overall growth 93 percentile.  Symmetric.  HEVER 13.27.  Vertex.  Fundal placenta.  Active female fetus      1. IUP 32w0d  2. Routine care   3.  LGA: Symmetric growth today.  Bigger baby but patient was 9+ pounds at birth so this is to be expected.  Normal labs.

## 2020-12-23 ENCOUNTER — ROUTINE PRENATAL (OUTPATIENT)
Dept: OBSTETRICS AND GYNECOLOGY | Facility: CLINIC | Age: 35
End: 2020-12-23

## 2020-12-23 VITALS — WEIGHT: 188 LBS | BODY MASS INDEX: 25.5 KG/M2 | DIASTOLIC BLOOD PRESSURE: 62 MMHG | SYSTOLIC BLOOD PRESSURE: 118 MMHG

## 2020-12-23 DIAGNOSIS — O36.63X0 EXCESSIVE FETAL GROWTH AFFECTING MANAGEMENT OF PREGNANCY IN THIRD TRIMESTER, SINGLE OR UNSPECIFIED FETUS: ICD-10-CM

## 2020-12-23 DIAGNOSIS — O09.522 MULTIGRAVIDA OF ADVANCED MATERNAL AGE IN SECOND TRIMESTER: ICD-10-CM

## 2020-12-23 DIAGNOSIS — J45.20 MILD INTERMITTENT ASTHMA WITHOUT COMPLICATION: ICD-10-CM

## 2020-12-23 DIAGNOSIS — Z34.93 PRENATAL CARE IN THIRD TRIMESTER: Primary | ICD-10-CM

## 2020-12-23 PROCEDURE — 0502F SUBSEQUENT PRENATAL CARE: CPT | Performed by: OBSTETRICS & GYNECOLOGY

## 2020-12-23 NOTE — PROGRESS NOTES
Chief Complaint   Patient presents with   • Routine Prenatal Visit     No complaints       HPI:   Bel is a  currently at 34w0d who today reports the following:  Contractions - No; Leaking - No; Vaginal bleeding -  No; Swelling of extremities - No. Good fetal movement - YES.    ROS:  GI: Nausea - No; Constipation - No; Diarrhea - No. RUQ pain - No    Neuro: Headache - No; Visual disturbances - No.    Pertinent items are noted in HPI, all other systems reviewed and negative    Review of History:  The following portions of the patient's history were reviewed and updated as appropriate:problem list, current medications, allergies, past family history, past medical history, past social history and past surgical history.    Current Outpatient Medications on File Prior to Visit   Medication Sig Dispense Refill   • ADVAIR DISKUS 250-50 MCG/DOSE DISKUS Inhale 1 puff 2 (Two) Times a Day. 3 each 11   • fluticasone (FLONASE) 50 MCG/ACT nasal spray 2 sprays into each nostril Daily.     • ipratropium-albuterol (DUO-NEB) 0.5-2.5 mg/3 ml nebulizer Take 3 mL by nebulization Every 4 (Four) Hours As Needed for Wheezing. 360 mL 5   • levocetirizine (Xyzal) 5 MG tablet Take 1 tablet by mouth Every Evening. 90 tablet 3   • montelukast (SINGULAIR) 10 MG tablet Take 1 tablet by mouth Daily. 90 tablet 2   • MULTIPLE VITAMIN PO Take  by mouth.     • PROAIR  (90 BASE) MCG/ACT inhaler inhale 2 puffs by mouth every 6 hours if needed for cough or wheezing  0     No current facility-administered medications on file prior to visit.        EXAM:  /62   Wt 85.3 kg (188 lb)   LMP 2020 (Approximate)   BMI 25.50 kg/m²     Gen: NAD, conversant  Pulm: No use of accessory muscles, normal respirations  Abdomen: Gravid, nontender, size = dates, + fetal cardiac activity  Ext: no edema, no rashes, WWP  Gait: normal for pregnancy  Psych: Mood, insight, judgement intact  SVE: Not performed     Lab Results   Component Value Date     ABO O 2020    RH Positive 2020    ABSCRN Negative 2020       Social History    Tobacco Use      Smoking status: Former Smoker        Packs/day: 0.05        Types: Cigarettes        Quit date: 2020        Years since quittin.6      Smokeless tobacco: Never Used      I have reviewed the prenatal labs and previous ultrasounds today.    MDM:  Diagnosis: Supervision of high risk pregnancy   AMA  Asthma  Seasonal allergies  Panic disorder  S>D   Tests/Orders/Rx today: No orders of the defined types were placed in this encounter.    Meds Ordered: none   Topics discussed: kick counts and fetal movement  PIH precautions   labor signs and symptoms  U/S findings   Asthma  Mood stable  NIPS NEG   Tests next visit: U/S for EFW   Next visit: 2 week(s)     Louie Oconnor MD  Obstetrics and Gynecology  [facility]

## 2021-01-05 ENCOUNTER — ROUTINE PRENATAL (OUTPATIENT)
Dept: OBSTETRICS AND GYNECOLOGY | Facility: CLINIC | Age: 36
End: 2021-01-05

## 2021-01-05 VITALS — WEIGHT: 194 LBS | DIASTOLIC BLOOD PRESSURE: 70 MMHG | BODY MASS INDEX: 26.31 KG/M2 | SYSTOLIC BLOOD PRESSURE: 122 MMHG

## 2021-01-05 DIAGNOSIS — Z34.93 PRENATAL CARE IN THIRD TRIMESTER: Primary | ICD-10-CM

## 2021-01-05 DIAGNOSIS — Z36.85 ANTENATAL SCREENING FOR STREPTOCOCCUS B: ICD-10-CM

## 2021-01-05 DIAGNOSIS — O36.63X0 EXCESSIVE FETAL GROWTH AFFECTING MANAGEMENT OF PREGNANCY IN THIRD TRIMESTER, SINGLE OR UNSPECIFIED FETUS: ICD-10-CM

## 2021-01-05 PROCEDURE — 0502F SUBSEQUENT PRENATAL CARE: CPT | Performed by: OBSTETRICS & GYNECOLOGY

## 2021-01-05 NOTE — PROGRESS NOTES
Chief Complaint   Patient presents with   • Routine Prenatal Visit     Growth scan and GBS today, no complaints.         HPI:   , 35w6d gestation reports doing well    ROS:  See Prenatal Episode/Flowsheet  /70   Wt 88 kg (194 lb)   LMP 2020 (Approximate)   BMI 26.31 kg/m²      EXAM:  EXTREMITIES:  No swelling-See Prenatal Episode/Flowsheet    ABDOMEN:  FHTs/Movement noted-See Prenatal Episode/Flowsheet    URINE GLUCOSE/PROTEIN:  See Prenatal Episode/Flowsheet    PELVIC EXAM:  See Prenatal Episode/Flowsheet  CV:  Lungs:  GYN:    MDM:    Lab Results   Component Value Date    HGB 11.5 (L) 2020    RUBELLAABIGG 1.89 2020    HEPBSAG Negative 2020    ABO O 2020    RH Positive 2020    ABSCRN Negative 2020    [id number] Non Reactive 2020    HEPCVIRUSABY <0.1 2020       U/S: Overall growth 96 percentile.  Symmetric.  HEVER 17.54.  Vertex.  Posterior placenta    1. IUP 35w6d  2. Routine care   3.  LGA.  Patient was 9/2 pounds.  Her sister was over 10 pounds.  4.  GBS done  5. AMA: normal genetic testing

## 2021-01-07 LAB — GP B STREP DNA SPEC QL NAA+PROBE: NEGATIVE

## 2021-01-11 ENCOUNTER — ROUTINE PRENATAL (OUTPATIENT)
Dept: OBSTETRICS AND GYNECOLOGY | Facility: CLINIC | Age: 36
End: 2021-01-11

## 2021-01-11 VITALS — WEIGHT: 193 LBS | BODY MASS INDEX: 26.18 KG/M2 | DIASTOLIC BLOOD PRESSURE: 78 MMHG | SYSTOLIC BLOOD PRESSURE: 128 MMHG

## 2021-01-11 DIAGNOSIS — O09.523 MULTIGRAVIDA OF ADVANCED MATERNAL AGE IN THIRD TRIMESTER: Primary | ICD-10-CM

## 2021-01-11 DIAGNOSIS — O36.63X0 EXCESSIVE FETAL GROWTH AFFECTING MANAGEMENT OF PREGNANCY IN THIRD TRIMESTER, SINGLE OR UNSPECIFIED FETUS: ICD-10-CM

## 2021-01-11 PROCEDURE — 0502F SUBSEQUENT PRENATAL CARE: CPT | Performed by: MIDWIFE

## 2021-01-11 NOTE — PROGRESS NOTES
Chief Complaint   Patient presents with   • Routine Prenatal Visit     Patient states she is doing well       HPI: Bel is a  currently at 36w5d who today reports the following:  Contractions - No; Leaking - No; Vaginal bleeding -  No; Swelling of extremities - mild; Baby is active - YES    ROS:   GI:   Nausea - No; Constipation - No   Neuro:  Headache - No; Visual disturbances - No.    Social History    Tobacco Use      Smoking status: Former Smoker        Packs/day: 0.05        Types: Cigarettes        Quit date: 2020        Years since quittin.6      Smokeless tobacco: Never Used      EXAM:   Vitals:  See prenatal flowsheet, /78, Wt -1#   Abdomen:   See prenatal flowsheet as noted and reviewed, soft, nontender   Pelvic:  See prenatal flowsheet   Urine:  See prenatal flowsheet as noted and reviewed    MDM:  Impression: Supervision of low risk pregnancy  LGA   Tests done today: none   Topics discussed: kick counts and fetal movement  labor signs and symptoms  GBS Neg   Tests next visit: none   Next visit: 1 weeks     This note was electronically signed.  Linda Wood, APRN  2021

## 2021-01-18 ENCOUNTER — ROUTINE PRENATAL (OUTPATIENT)
Dept: OBSTETRICS AND GYNECOLOGY | Facility: CLINIC | Age: 36
End: 2021-01-18

## 2021-01-18 VITALS — DIASTOLIC BLOOD PRESSURE: 72 MMHG | BODY MASS INDEX: 26.31 KG/M2 | SYSTOLIC BLOOD PRESSURE: 122 MMHG | WEIGHT: 194 LBS

## 2021-01-18 DIAGNOSIS — O09.523 MULTIGRAVIDA OF ADVANCED MATERNAL AGE IN THIRD TRIMESTER: Primary | ICD-10-CM

## 2021-01-18 DIAGNOSIS — O36.63X0 EXCESSIVE FETAL GROWTH AFFECTING MANAGEMENT OF PREGNANCY IN THIRD TRIMESTER, SINGLE OR UNSPECIFIED FETUS: ICD-10-CM

## 2021-01-18 PROCEDURE — 0502F SUBSEQUENT PRENATAL CARE: CPT | Performed by: NURSE PRACTITIONER

## 2021-01-18 NOTE — PROGRESS NOTES
[id number]  Chief Complaint   Patient presents with   • Routine Prenatal Visit     Patient states she is doing well        HPI  Bel is a  currently at 37w5d who today reports the following:    Good FM   No c/o   Still working     Contractions - No;   Leaking - No;   Vaginal bleeding -  No;   Swelling of extremities - yes       ROS:        GI: Nausea - No;   Constipation - No;   Diarrhea - No    Neuro: Headache - No;   Visual change - No    Pertinent items are noted in HPI, all other systems reviewed and negative     Social History     Tobacco Use   • Smoking status: Former Smoker     Packs/day: 0.05     Types: Cigarettes     Quit date: 2020     Years since quittin.7   • Smokeless tobacco: Never Used   Substance Use Topics   • Alcohol use: Not Currently     Comment: social   • Drug use: No          EXAM  /72   Wt 88 kg (194 lb)   LMP 2020 (Approximate)   BMI 26.31 kg/m²  -See Prenatal Assessment  General Appearance:  Pleasant  Lungs: Breathing unlabored  Abdomen:  See flow sheet for Fundal ht, FM, FHT's  LE: minimal edema  V/E: optional - declined      Lab Results   Component Value Date    ABO O 2020    RH Positive 2020    ABSCRN Negative 2020       MDM  Impression: Supervision of normal pregnancy   AMA  LGA    Tests done today: none   Topics discussed: S/S labor and adeq FM/Kick Counts  discussed LGA / macrosomia fetus / options   Option to start maternity leave   encouraged questions - call prn    Tests next visit: none         Current Outpatient Medications:   •  ADVAIR DISKUS 250-50 MCG/DOSE DISKUS, Inhale 1 puff 2 (Two) Times a Day., Disp: 3 each, Rfl: 11  •  fluticasone (FLONASE) 50 MCG/ACT nasal spray, 2 sprays into each nostril Daily., Disp: , Rfl:   •  ipratropium-albuterol (DUO-NEB) 0.5-2.5 mg/3 ml nebulizer, Take 3 mL by nebulization Every 4 (Four) Hours As Needed for Wheezing., Disp: 360 mL, Rfl: 5  •  levocetirizine (Xyzal) 5 MG tablet, Take 1 tablet by mouth  Every Evening., Disp: 90 tablet, Rfl: 3  •  montelukast (SINGULAIR) 10 MG tablet, Take 1 tablet by mouth Daily., Disp: 90 tablet, Rfl: 2  •  MULTIPLE VITAMIN PO, Take  by mouth., Disp: , Rfl:   •  PROAIR  (90 BASE) MCG/ACT inhaler, inhale 2 puffs by mouth every 6 hours if needed for cough or wheezing, Disp: , Rfl: 0    Past Surgical History:   Procedure Laterality Date   • CERVICAL BIOPSY  W/ LOOP ELECTRODE EXCISION     • CERVICAL CONIZATION, LEEP  2016    MILD/MODERATE DYSPLASIA (DR ESPARZA)   • CRYOTHERAPY      DR MARIE   • EYE SURGERY         OB History        1    Para   0    Term   0       0    AB   0    Living   0       SAB   0    TAB   0    Ectopic   0    Molar        Multiple   0    Live Births                    Past Medical History:   Diagnosis Date   • Abnormal Pap smear of cervix 2016    ASCUS HIGH RISK HPV (LIBRADO TAM, KRISTA)   • Abnormal Pap smear of cervix 2010    HIEU 1, LSIL MILD DYSPLASIA (DR ESPARZA)   • Asthma    • Chlamydia    • Depression    • Dysplasia of cervix 2016    MODERATE   • KIYA (generalized anxiety disorder)    • Gonorrhea    • History of colposcopy with cervical biopsy 2016    MODERATE DYSPLASIA HIEU 11, HSIL (DR ESPARZA)   • History of colposcopy with cervical biopsy 2010    MILD DYSPLASIA (DR ESPARZA)   • HPV (human papilloma virus) infection    • HSV-1 infection 2010    DR ESPARZA   • Panic disorder        Family History   Problem Relation Age of Onset   • Hypertension Father    • Hyperlipidemia Father    • Cancer Father         skin   • Breast cancer Mother    • Hypertension Mother    • Hyperlipidemia Mother    • Cancer Mother         breast  and  skin   • Diabetes Maternal Grandmother    • Mental illness Maternal Grandmother    • Diabetes Maternal Uncle    • Cancer Paternal Grandmother         colon

## 2021-01-23 ENCOUNTER — HOSPITAL ENCOUNTER (INPATIENT)
Facility: HOSPITAL | Age: 36
LOS: 3 days | Discharge: HOME | End: 2021-01-26
Attending: OBSTETRICS & GYNECOLOGY | Admitting: OBSTETRICS & GYNECOLOGY

## 2021-01-23 LAB
A1 MICROGLOB PLACENTAL VAG QL: POSITIVE
ABO GROUP BLD: NORMAL
ABO GROUP BLD: NORMAL
BASOPHILS # BLD AUTO: 0.03 10*3/MM3 (ref 0–0.2)
BASOPHILS NFR BLD AUTO: 0.3 % (ref 0–1.5)
BILIRUB BLD-MCNC: NEGATIVE MG/DL
BLD GP AB SCN SERPL QL: NEGATIVE
CLARITY, POC: ABNORMAL
COLOR UR: YELLOW
DEPRECATED RDW RBC AUTO: 39 FL (ref 37–54)
EOSINOPHIL # BLD AUTO: 0.16 10*3/MM3 (ref 0–0.4)
EOSINOPHIL NFR BLD AUTO: 1.8 % (ref 0.3–6.2)
ERYTHROCYTE [DISTWIDTH] IN BLOOD BY AUTOMATED COUNT: 12.1 % (ref 12.3–15.4)
GLUCOSE UR STRIP-MCNC: NEGATIVE MG/DL
HCT VFR BLD AUTO: 29.2 % (ref 34–46.6)
HGB BLD-MCNC: 9.8 G/DL (ref 12–15.9)
IMM GRANULOCYTES # BLD AUTO: 0.06 10*3/MM3 (ref 0–0.05)
IMM GRANULOCYTES NFR BLD AUTO: 0.7 % (ref 0–0.5)
KETONES UR QL: NEGATIVE
LEUKOCYTE EST, POC: NEGATIVE
LYMPHOCYTES # BLD AUTO: 1.78 10*3/MM3 (ref 0.7–3.1)
LYMPHOCYTES NFR BLD AUTO: 19.5 % (ref 19.6–45.3)
MCH RBC QN AUTO: 29.6 PG (ref 26.6–33)
MCHC RBC AUTO-ENTMCNC: 33.6 G/DL (ref 31.5–35.7)
MCV RBC AUTO: 88.2 FL (ref 79–97)
MONOCYTES # BLD AUTO: 0.94 10*3/MM3 (ref 0.1–0.9)
MONOCYTES NFR BLD AUTO: 10.3 % (ref 5–12)
NEUTROPHILS NFR BLD AUTO: 6.17 10*3/MM3 (ref 1.7–7)
NEUTROPHILS NFR BLD AUTO: 67.4 % (ref 42.7–76)
NITRITE UR-MCNC: NEGATIVE MG/ML
NRBC BLD AUTO-RTO: 0 /100 WBC (ref 0–0.2)
PH UR: 6 [PH] (ref 5–8)
PLATELET # BLD AUTO: 181 10*3/MM3 (ref 140–450)
PMV BLD AUTO: 10.2 FL (ref 6–12)
PROT UR STRIP-MCNC: ABNORMAL MG/DL
RBC # BLD AUTO: 3.31 10*6/MM3 (ref 3.77–5.28)
RBC # UR STRIP: ABNORMAL /UL
RH BLD: POSITIVE
RH BLD: POSITIVE
SP GR UR: 1.02 (ref 1–1.03)
T&S EXPIRATION DATE: NORMAL
UROBILINOGEN UR QL: NORMAL
WBC # BLD AUTO: 9.14 10*3/MM3 (ref 3.4–10.8)

## 2021-01-23 PROCEDURE — 86901 BLOOD TYPING SEROLOGIC RH(D): CPT | Performed by: OBSTETRICS & GYNECOLOGY

## 2021-01-23 PROCEDURE — 86900 BLOOD TYPING SEROLOGIC ABO: CPT | Performed by: OBSTETRICS & GYNECOLOGY

## 2021-01-23 PROCEDURE — 59025 FETAL NON-STRESS TEST: CPT

## 2021-01-23 PROCEDURE — 81002 URINALYSIS NONAUTO W/O SCOPE: CPT | Performed by: OBSTETRICS & GYNECOLOGY

## 2021-01-23 PROCEDURE — 86900 BLOOD TYPING SEROLOGIC ABO: CPT

## 2021-01-23 PROCEDURE — 86850 RBC ANTIBODY SCREEN: CPT | Performed by: OBSTETRICS & GYNECOLOGY

## 2021-01-23 PROCEDURE — 85025 COMPLETE CBC W/AUTO DIFF WBC: CPT | Performed by: OBSTETRICS & GYNECOLOGY

## 2021-01-23 PROCEDURE — 86901 BLOOD TYPING SEROLOGIC RH(D): CPT

## 2021-01-23 PROCEDURE — 59025 FETAL NON-STRESS TEST: CPT | Performed by: OBSTETRICS & GYNECOLOGY

## 2021-01-23 PROCEDURE — G0463 HOSPITAL OUTPT CLINIC VISIT: HCPCS

## 2021-01-23 PROCEDURE — 84112 EVAL AMNIOTIC FLUID PROTEIN: CPT | Performed by: OBSTETRICS & GYNECOLOGY

## 2021-01-23 RX ORDER — MAGNESIUM CARB/ALUMINUM HYDROX 105-160MG
30 TABLET,CHEWABLE ORAL ONCE
Status: DISCONTINUED | OUTPATIENT
Start: 2021-01-23 | End: 2021-01-24 | Stop reason: HOSPADM

## 2021-01-23 RX ORDER — HYDROXYZINE HYDROCHLORIDE 25 MG/1
50 TABLET, FILM COATED ORAL NIGHTLY PRN
Status: DISCONTINUED | OUTPATIENT
Start: 2021-01-23 | End: 2021-01-24 | Stop reason: HOSPADM

## 2021-01-23 RX ORDER — SODIUM CHLORIDE, SODIUM LACTATE, POTASSIUM CHLORIDE, CALCIUM CHLORIDE 600; 310; 30; 20 MG/100ML; MG/100ML; MG/100ML; MG/100ML
125 INJECTION, SOLUTION INTRAVENOUS CONTINUOUS
Status: DISCONTINUED | OUTPATIENT
Start: 2021-01-23 | End: 2021-01-24

## 2021-01-23 RX ORDER — OXYTOCIN/0.9 % SODIUM CHLORIDE 30/500 ML
2-20 PLASTIC BAG, INJECTION (ML) INTRAVENOUS
Status: DISCONTINUED | OUTPATIENT
Start: 2021-01-24 | End: 2021-01-24

## 2021-01-23 RX ORDER — ACETAMINOPHEN 325 MG/1
650 TABLET ORAL EVERY 4 HOURS PRN
Status: DISCONTINUED | OUTPATIENT
Start: 2021-01-23 | End: 2021-01-24 | Stop reason: HOSPADM

## 2021-01-23 RX ORDER — PROMETHAZINE HYDROCHLORIDE 12.5 MG/1
12.5 SUPPOSITORY RECTAL EVERY 6 HOURS PRN
Status: DISCONTINUED | OUTPATIENT
Start: 2021-01-23 | End: 2021-01-24 | Stop reason: HOSPADM

## 2021-01-23 RX ORDER — SODIUM CHLORIDE 0.9 % (FLUSH) 0.9 %
1-10 SYRINGE (ML) INJECTION AS NEEDED
Status: DISCONTINUED | OUTPATIENT
Start: 2021-01-23 | End: 2021-01-24 | Stop reason: HOSPADM

## 2021-01-23 RX ORDER — SODIUM CHLORIDE 0.9 % (FLUSH) 0.9 %
3 SYRINGE (ML) INJECTION EVERY 12 HOURS SCHEDULED
Status: DISCONTINUED | OUTPATIENT
Start: 2021-01-23 | End: 2021-01-24 | Stop reason: HOSPADM

## 2021-01-23 RX ORDER — MORPHINE SULFATE 4 MG/ML
4 INJECTION, SOLUTION INTRAMUSCULAR; INTRAVENOUS EVERY 4 HOURS PRN
Status: DISCONTINUED | OUTPATIENT
Start: 2021-01-23 | End: 2021-01-24 | Stop reason: HOSPADM

## 2021-01-23 RX ORDER — PROMETHAZINE HYDROCHLORIDE 12.5 MG/1
12.5 TABLET ORAL EVERY 6 HOURS PRN
Status: DISCONTINUED | OUTPATIENT
Start: 2021-01-23 | End: 2021-01-24 | Stop reason: HOSPADM

## 2021-01-23 RX ORDER — LIDOCAINE HYDROCHLORIDE 10 MG/ML
5 INJECTION, SOLUTION EPIDURAL; INFILTRATION; INTRACAUDAL; PERINEURAL AS NEEDED
Status: DISCONTINUED | OUTPATIENT
Start: 2021-01-23 | End: 2021-01-24 | Stop reason: HOSPADM

## 2021-01-23 RX ORDER — MORPHINE SULFATE 2 MG/ML
6 INJECTION, SOLUTION INTRAMUSCULAR; INTRAVENOUS
Status: DISCONTINUED | OUTPATIENT
Start: 2021-01-23 | End: 2021-01-24 | Stop reason: HOSPADM

## 2021-01-23 RX ADMIN — SODIUM CHLORIDE, POTASSIUM CHLORIDE, SODIUM LACTATE AND CALCIUM CHLORIDE 125 ML/HR: 600; 310; 30; 20 INJECTION, SOLUTION INTRAVENOUS at 23:00

## 2021-01-24 ENCOUNTER — ANESTHESIA EVENT (OUTPATIENT)
Dept: LABOR AND DELIVERY | Facility: HOSPITAL | Age: 36
End: 2021-01-24

## 2021-01-24 ENCOUNTER — ANESTHESIA (OUTPATIENT)
Dept: LABOR AND DELIVERY | Facility: HOSPITAL | Age: 36
End: 2021-01-24

## 2021-01-24 PROCEDURE — C1755 CATHETER, INTRASPINAL: HCPCS | Performed by: NURSE ANESTHETIST, CERTIFIED REGISTERED

## 2021-01-24 PROCEDURE — 59400 OBSTETRICAL CARE: CPT | Performed by: OBSTETRICS & GYNECOLOGY

## 2021-01-24 PROCEDURE — 63710000001 PROMETHAZINE PER 12.5 MG: Performed by: OBSTETRICS & GYNECOLOGY

## 2021-01-24 PROCEDURE — S0260 H&P FOR SURGERY: HCPCS | Performed by: OBSTETRICS & GYNECOLOGY

## 2021-01-24 PROCEDURE — 59025 FETAL NON-STRESS TEST: CPT

## 2021-01-24 PROCEDURE — 51703 INSERT BLADDER CATH COMPLEX: CPT

## 2021-01-24 PROCEDURE — 25010000002 MORPHINE SULFATE (PF) 2 MG/ML SOLUTION: Performed by: OBSTETRICS & GYNECOLOGY

## 2021-01-24 RX ORDER — LANOLIN
CREAM (GRAM) TOPICAL
Status: DISCONTINUED | OUTPATIENT
Start: 2021-01-24 | End: 2021-01-26 | Stop reason: HOSPADM

## 2021-01-24 RX ORDER — IBUPROFEN 600 MG/1
600 TABLET ORAL EVERY 6 HOURS PRN
Status: DISCONTINUED | OUTPATIENT
Start: 2021-01-24 | End: 2021-01-26 | Stop reason: HOSPADM

## 2021-01-24 RX ORDER — OXYTOCIN 10 [USP'U]/ML
INJECTION, SOLUTION INTRAMUSCULAR; INTRAVENOUS
Status: DISCONTINUED
Start: 2021-01-24 | End: 2021-01-24 | Stop reason: WASHOUT

## 2021-01-24 RX ORDER — SODIUM CHLORIDE 0.9 % (FLUSH) 0.9 %
1-10 SYRINGE (ML) INJECTION AS NEEDED
Status: DISCONTINUED | OUTPATIENT
Start: 2021-01-24 | End: 2021-01-26 | Stop reason: HOSPADM

## 2021-01-24 RX ORDER — DIPHENHYDRAMINE HCL 25 MG
25 CAPSULE ORAL NIGHTLY PRN
Status: DISCONTINUED | OUTPATIENT
Start: 2021-01-24 | End: 2021-01-26 | Stop reason: HOSPADM

## 2021-01-24 RX ORDER — METHYLERGONOVINE MALEATE 0.2 MG/ML
200 INJECTION INTRAVENOUS ONCE AS NEEDED
Status: DISCONTINUED | OUTPATIENT
Start: 2021-01-24 | End: 2021-01-24 | Stop reason: HOSPADM

## 2021-01-24 RX ORDER — PROMETHAZINE HYDROCHLORIDE 12.5 MG/1
12.5 SUPPOSITORY RECTAL EVERY 6 HOURS PRN
Status: DISCONTINUED | OUTPATIENT
Start: 2021-01-24 | End: 2021-01-26 | Stop reason: HOSPADM

## 2021-01-24 RX ORDER — ONDANSETRON 2 MG/ML
4 INJECTION INTRAMUSCULAR; INTRAVENOUS ONCE AS NEEDED
Status: DISCONTINUED | OUTPATIENT
Start: 2021-01-24 | End: 2021-01-24 | Stop reason: HOSPADM

## 2021-01-24 RX ORDER — HYDROCODONE BITARTRATE AND ACETAMINOPHEN 5; 325 MG/1; MG/1
2 TABLET ORAL ONCE AS NEEDED
Status: DISCONTINUED | OUTPATIENT
Start: 2021-01-24 | End: 2021-01-24 | Stop reason: HOSPADM

## 2021-01-24 RX ORDER — HYDROCODONE BITARTRATE AND ACETAMINOPHEN 5; 325 MG/1; MG/1
1 TABLET ORAL EVERY 4 HOURS PRN
Status: DISCONTINUED | OUTPATIENT
Start: 2021-01-24 | End: 2021-01-26 | Stop reason: HOSPADM

## 2021-01-24 RX ORDER — OXYTOCIN/0.9 % SODIUM CHLORIDE 30/500 ML
650 PLASTIC BAG, INJECTION (ML) INTRAVENOUS ONCE
Status: DISCONTINUED | OUTPATIENT
Start: 2021-01-24 | End: 2021-01-24 | Stop reason: HOSPADM

## 2021-01-24 RX ORDER — BISACODYL 10 MG
10 SUPPOSITORY, RECTAL RECTAL DAILY PRN
Status: DISCONTINUED | OUTPATIENT
Start: 2021-01-25 | End: 2021-01-26 | Stop reason: HOSPADM

## 2021-01-24 RX ORDER — DOCUSATE SODIUM 100 MG/1
100 CAPSULE, LIQUID FILLED ORAL 2 TIMES DAILY
Status: DISCONTINUED | OUTPATIENT
Start: 2021-01-24 | End: 2021-01-26 | Stop reason: HOSPADM

## 2021-01-24 RX ORDER — PRENATAL VIT/IRON FUM/FOLIC AC 27MG-0.8MG
1 TABLET ORAL DAILY
Status: DISCONTINUED | OUTPATIENT
Start: 2021-01-24 | End: 2021-01-26 | Stop reason: HOSPADM

## 2021-01-24 RX ORDER — OXYTOCIN/0.9 % SODIUM CHLORIDE 30/500 ML
85 PLASTIC BAG, INJECTION (ML) INTRAVENOUS ONCE
Status: COMPLETED | OUTPATIENT
Start: 2021-01-24 | End: 2021-01-24

## 2021-01-24 RX ORDER — MORPHINE SULFATE 4 MG/ML
4 INJECTION, SOLUTION INTRAMUSCULAR; INTRAVENOUS ONCE AS NEEDED
Status: DISCONTINUED | OUTPATIENT
Start: 2021-01-24 | End: 2021-01-24 | Stop reason: HOSPADM

## 2021-01-24 RX ORDER — MISOPROSTOL 200 UG/1
800 TABLET ORAL AS NEEDED
Status: DISCONTINUED | OUTPATIENT
Start: 2021-01-24 | End: 2021-01-24 | Stop reason: HOSPADM

## 2021-01-24 RX ORDER — PROMETHAZINE HYDROCHLORIDE 25 MG/1
25 TABLET ORAL EVERY 6 HOURS PRN
Status: DISCONTINUED | OUTPATIENT
Start: 2021-01-24 | End: 2021-01-26 | Stop reason: HOSPADM

## 2021-01-24 RX ORDER — ONDANSETRON 4 MG/1
4 TABLET, FILM COATED ORAL EVERY 8 HOURS PRN
Status: DISCONTINUED | OUTPATIENT
Start: 2021-01-24 | End: 2021-01-26 | Stop reason: HOSPADM

## 2021-01-24 RX ORDER — HYDROCORTISONE 25 MG/G
1 CREAM TOPICAL AS NEEDED
Status: DISCONTINUED | OUTPATIENT
Start: 2021-01-24 | End: 2021-01-26 | Stop reason: HOSPADM

## 2021-01-24 RX ORDER — OXYTOCIN/0.9 % SODIUM CHLORIDE 30/500 ML
2-20 PLASTIC BAG, INJECTION (ML) INTRAVENOUS
Status: DISCONTINUED | OUTPATIENT
Start: 2021-01-24 | End: 2021-01-24 | Stop reason: HOSPADM

## 2021-01-24 RX ORDER — EPHEDRINE SULFATE 5 MG/ML
5 INJECTION INTRAVENOUS
Status: DISCONTINUED | OUTPATIENT
Start: 2021-01-24 | End: 2021-01-24 | Stop reason: HOSPADM

## 2021-01-24 RX ORDER — ONDANSETRON 2 MG/ML
4 INJECTION INTRAMUSCULAR; INTRAVENOUS EVERY 6 HOURS PRN
Status: DISCONTINUED | OUTPATIENT
Start: 2021-01-24 | End: 2021-01-26 | Stop reason: HOSPADM

## 2021-01-24 RX ORDER — TRISODIUM CITRATE DIHYDRATE AND CITRIC ACID MONOHYDRATE 500; 334 MG/5ML; MG/5ML
30 SOLUTION ORAL ONCE
Status: DISCONTINUED | OUTPATIENT
Start: 2021-01-24 | End: 2021-01-24 | Stop reason: HOSPADM

## 2021-01-24 RX ORDER — FENTANYL CITRATE 50 UG/ML
INJECTION, SOLUTION INTRAMUSCULAR; INTRAVENOUS
Status: DISPENSED
Start: 2021-01-24 | End: 2021-01-24

## 2021-01-24 RX ORDER — CARBOPROST TROMETHAMINE 250 UG/ML
250 INJECTION, SOLUTION INTRAMUSCULAR ONCE AS NEEDED
Status: DISCONTINUED | OUTPATIENT
Start: 2021-01-24 | End: 2021-01-24 | Stop reason: HOSPADM

## 2021-01-24 RX ORDER — ONDANSETRON 4 MG/1
4 TABLET, FILM COATED ORAL ONCE AS NEEDED
Status: DISCONTINUED | OUTPATIENT
Start: 2021-01-24 | End: 2021-01-24 | Stop reason: HOSPADM

## 2021-01-24 RX ORDER — HYDROCODONE BITARTRATE AND ACETAMINOPHEN 7.5; 325 MG/1; MG/1
1 TABLET ORAL EVERY 4 HOURS PRN
Status: DISCONTINUED | OUTPATIENT
Start: 2021-01-24 | End: 2021-01-26 | Stop reason: HOSPADM

## 2021-01-24 RX ORDER — MORPHINE SULFATE 2 MG/ML
2 INJECTION, SOLUTION INTRAMUSCULAR; INTRAVENOUS ONCE AS NEEDED
Status: DISCONTINUED | OUTPATIENT
Start: 2021-01-24 | End: 2021-01-24 | Stop reason: HOSPADM

## 2021-01-24 RX ORDER — ACETAMINOPHEN 325 MG/1
650 TABLET ORAL ONCE AS NEEDED
Status: DISCONTINUED | OUTPATIENT
Start: 2021-01-24 | End: 2021-01-24 | Stop reason: HOSPADM

## 2021-01-24 RX ADMIN — PROMETHAZINE HYDROCHLORIDE 12.5 MG: 12.5 TABLET ORAL at 01:53

## 2021-01-24 RX ADMIN — DOCUSATE SODIUM 100 MG: 100 CAPSULE, LIQUID FILLED ORAL at 19:56

## 2021-01-24 RX ADMIN — BENZOCAINE AND LEVOMENTHOL: 200; 5 SPRAY TOPICAL at 17:38

## 2021-01-24 RX ADMIN — Medication 1 MILLI-UNITS/MIN: at 02:24

## 2021-01-24 RX ADMIN — MORPHINE SULFATE 6 MG: 2 INJECTION, SOLUTION INTRAMUSCULAR; INTRAVENOUS at 03:21

## 2021-01-24 RX ADMIN — SODIUM CHLORIDE, POTASSIUM CHLORIDE, SODIUM LACTATE AND CALCIUM CHLORIDE 125 ML/HR: 600; 310; 30; 20 INJECTION, SOLUTION INTRAVENOUS at 05:12

## 2021-01-24 RX ADMIN — SODIUM CHLORIDE, POTASSIUM CHLORIDE, SODIUM LACTATE AND CALCIUM CHLORIDE 125 ML/HR: 600; 310; 30; 20 INJECTION, SOLUTION INTRAVENOUS at 12:28

## 2021-01-24 RX ADMIN — IBUPROFEN 600 MG: 600 TABLET ORAL at 17:38

## 2021-01-24 RX ADMIN — Medication 85 ML/HR: at 15:34

## 2021-01-24 NOTE — ANESTHESIA PREPROCEDURE EVALUATION
Anesthesia Evaluation     Patient summary reviewed and Nursing notes reviewed   NPO Solid Status: > 8 hours  NPO Liquid Status: > 8 hours           Airway   Mallampati: II  TM distance: >3 FB  Neck ROM: full  No difficulty expected  Dental - normal exam     Pulmonary - normal exam   (+) asthma,  Cardiovascular - normal exam        Neuro/Psych  GI/Hepatic/Renal/Endo      Musculoskeletal     Abdominal  - normal exam   Substance History      OB/GYN          Other                        Anesthesia Plan    ASA 2     epidural     intravenous induction     Anesthetic plan, all risks, benefits, and alternatives have been provided, discussed and informed consent has been obtained with: patient.    Plan discussed with CRNA.

## 2021-01-24 NOTE — ANESTHESIA PROCEDURE NOTES
Labor Epidural    Pre-sedation assessment completed: 2021 8:10 AM    Patient reassessed immediately prior to procedure    Patient location during procedure: OB  Start Time: 2021 8:12 AM  Stop Time: 2021 8:20 AM  Indication:at surgeon's request  Performed By  CRNA: Ignacio Tejeda CRNA  Preanesthetic Checklist  Completed: patient identified, site marked, surgical consent, pre-op evaluation, timeout performed, IV checked, risks and benefits discussed and monitors and equipment checked  Additional Notes  Risks of epidural analgesia discussed , including but not limited to:  Headache, itching, nausea and vomiting, infection, failure, decreased BP, decreased fetal heart rate, possible . Permanent chronic back pain, nerve damage, paralysis, etc    Skin localized with lidocaine skin wheal.  Test dose 4 ml of 1.5% lidocaine with 1:200,000 Epinephrine - Negative    Bolus dose of 10 ml Fentanyl/Ropivacaine solution given and epidural infusion started.  Prep:  Pt Position:sitting  Sterile Tech:cap, gloves, mask and sterile barrier  Prep:chlorhexidine gluconate and isopropyl alcohol  Monitoring:blood pressure monitoring and continuous pulse oximetry  Epidural Block Procedure:  Approach:midline  Guidance:landmark technique and palpation technique  Location:L3-L4  Needle Type:Tuohy  Needle Gauge:18 G  Loss of Resistance Medium: saline  Loss of Resistance: 5cm  Cath Depth at skin:11 cm  Paresthesia: none  Aspiration:negative  Test Dose:negative  Number of Attempts: 1  Post Assessment:  Dressing:occlusive dressing applied and secured with tape  Pt Tolerance:patient tolerated the procedure well with no apparent complications  Complications:no

## 2021-01-25 LAB
BASOPHILS # BLD AUTO: 0.03 10*3/MM3 (ref 0–0.2)
BASOPHILS NFR BLD AUTO: 0.2 % (ref 0–1.5)
DEPRECATED RDW RBC AUTO: 39.8 FL (ref 37–54)
EOSINOPHIL # BLD AUTO: 0.04 10*3/MM3 (ref 0–0.4)
EOSINOPHIL NFR BLD AUTO: 0.3 % (ref 0.3–6.2)
ERYTHROCYTE [DISTWIDTH] IN BLOOD BY AUTOMATED COUNT: 12.3 % (ref 12.3–15.4)
HCT VFR BLD AUTO: 28 % (ref 34–46.6)
HGB BLD-MCNC: 9.4 G/DL (ref 12–15.9)
IMM GRANULOCYTES # BLD AUTO: 0.09 10*3/MM3 (ref 0–0.05)
IMM GRANULOCYTES NFR BLD AUTO: 0.6 % (ref 0–0.5)
LYMPHOCYTES # BLD AUTO: 1.75 10*3/MM3 (ref 0.7–3.1)
LYMPHOCYTES NFR BLD AUTO: 11.2 % (ref 19.6–45.3)
MCH RBC QN AUTO: 29.4 PG (ref 26.6–33)
MCHC RBC AUTO-ENTMCNC: 33.6 G/DL (ref 31.5–35.7)
MCV RBC AUTO: 87.5 FL (ref 79–97)
MONOCYTES # BLD AUTO: 0.95 10*3/MM3 (ref 0.1–0.9)
MONOCYTES NFR BLD AUTO: 6.1 % (ref 5–12)
NEUTROPHILS NFR BLD AUTO: 12.82 10*3/MM3 (ref 1.7–7)
NEUTROPHILS NFR BLD AUTO: 81.6 % (ref 42.7–76)
NRBC BLD AUTO-RTO: 0 /100 WBC (ref 0–0.2)
PLATELET # BLD AUTO: 179 10*3/MM3 (ref 140–450)
PMV BLD AUTO: 9.9 FL (ref 6–12)
RBC # BLD AUTO: 3.2 10*6/MM3 (ref 3.77–5.28)
WBC # BLD AUTO: 15.68 10*3/MM3 (ref 3.4–10.8)

## 2021-01-25 PROCEDURE — 85025 COMPLETE CBC W/AUTO DIFF WBC: CPT | Performed by: OBSTETRICS & GYNECOLOGY

## 2021-01-25 RX ADMIN — IBUPROFEN 600 MG: 600 TABLET ORAL at 11:40

## 2021-01-25 RX ADMIN — DOCUSATE SODIUM 100 MG: 100 CAPSULE, LIQUID FILLED ORAL at 12:29

## 2021-01-25 RX ADMIN — DOCUSATE SODIUM 100 MG: 100 CAPSULE, LIQUID FILLED ORAL at 22:07

## 2021-01-25 RX ADMIN — DOCUSATE SODIUM 100 MG: 100 CAPSULE, LIQUID FILLED ORAL at 07:47

## 2021-01-25 RX ADMIN — IBUPROFEN 600 MG: 600 TABLET ORAL at 05:55

## 2021-01-25 RX ADMIN — IBUPROFEN 600 MG: 600 TABLET ORAL at 17:50

## 2021-01-25 RX ADMIN — PRENATAL VITAMINS-IRON FUMARATE 27 MG IRON-FOLIC ACID 0.8 MG TABLET 1 TABLET: at 07:47

## 2021-01-26 VITALS
BODY MASS INDEX: 26.28 KG/M2 | SYSTOLIC BLOOD PRESSURE: 140 MMHG | TEMPERATURE: 97.2 F | DIASTOLIC BLOOD PRESSURE: 63 MMHG | HEART RATE: 57 BPM | RESPIRATION RATE: 18 BRPM | OXYGEN SATURATION: 98 % | HEIGHT: 72 IN | WEIGHT: 194 LBS

## 2021-01-26 PROCEDURE — 25010000002 TDAP 5-2.5-18.5 LF-MCG/0.5 SUSPENSION: Performed by: OBSTETRICS & GYNECOLOGY

## 2021-01-26 PROCEDURE — 90715 TDAP VACCINE 7 YRS/> IM: CPT | Performed by: OBSTETRICS & GYNECOLOGY

## 2021-01-26 PROCEDURE — 90471 IMMUNIZATION ADMIN: CPT | Performed by: OBSTETRICS & GYNECOLOGY

## 2021-01-26 RX ORDER — DOCUSATE SODIUM 100 MG/1
100 CAPSULE, LIQUID FILLED ORAL 2 TIMES DAILY
Qty: 60 CAPSULE | Refills: 0 | Status: SHIPPED | OUTPATIENT
Start: 2021-01-26 | End: 2021-06-09

## 2021-01-26 RX ORDER — IBUPROFEN 800 MG/1
800 TABLET ORAL EVERY 8 HOURS PRN
Qty: 40 TABLET | Refills: 0 | Status: SHIPPED | OUTPATIENT
Start: 2021-01-26 | End: 2021-03-05

## 2021-01-26 RX ORDER — ACETAMINOPHEN 500 MG
1000 TABLET ORAL EVERY 8 HOURS
Qty: 60 TABLET | Refills: 0 | Status: SHIPPED | OUTPATIENT
Start: 2021-01-26 | End: 2021-03-05

## 2021-01-26 RX ORDER — FERROUS SULFATE 325(65) MG
325 TABLET ORAL
Qty: 60 TABLET | Refills: 0 | Status: SHIPPED | OUTPATIENT
Start: 2021-01-26 | End: 2021-02-18

## 2021-01-26 RX ADMIN — DOCUSATE SODIUM 100 MG: 100 CAPSULE, LIQUID FILLED ORAL at 09:05

## 2021-01-26 RX ADMIN — PRENATAL VITAMINS-IRON FUMARATE 27 MG IRON-FOLIC ACID 0.8 MG TABLET 1 TABLET: at 09:05

## 2021-01-26 RX ADMIN — TETANUS TOXOID, REDUCED DIPHTHERIA TOXOID AND ACELLULAR PERTUSSIS VACCINE, ADSORBED 0.5 ML: 5; 2.5; 8; 8; 2.5 SUSPENSION INTRAMUSCULAR at 07:35

## 2021-01-26 RX ADMIN — IBUPROFEN 600 MG: 600 TABLET ORAL at 04:17

## 2021-01-28 NOTE — ANESTHESIA POSTPROCEDURE EVALUATION
Patient: Bel Brown    Procedure Summary     Date: 01/24/21 Room / Location:     Anesthesia Start: 0847 Anesthesia Stop: 1359    Procedure: LABOR ANALGESIA Diagnosis:     Scheduled Providers:  Provider: Ignacio Tejeda CRNA    Anesthesia Type: epidural ASA Status: 2          Anesthesia Type: epidural    Vitals  Vitals Value Taken Time   /63 01/26/21 0900   Temp 97.2 °F (36.2 °C) 01/26/21 0900   Pulse 57 01/26/21 0900   Resp 18 01/26/21 0900   SpO2 98 % 01/26/21 0900           Post Anesthesia Care and Evaluation    Patient location during evaluation: bedside  Patient participation: complete - patient participated  Level of consciousness: awake  Pain score: 0  Pain management: adequate  Airway patency: patent  Anesthetic complications: No anesthetic complications  PONV Status: controlled  Cardiovascular status: acceptable and stable  Respiratory status: acceptable and room air  Hydration status: acceptable

## 2021-02-18 RX ORDER — FERROUS SULFATE 325(65) MG
TABLET ORAL
Qty: 60 TABLET | Refills: 1 | Status: SHIPPED | OUTPATIENT
Start: 2021-02-18 | End: 2021-03-05

## 2021-03-05 ENCOUNTER — POSTPARTUM VISIT (OUTPATIENT)
Dept: OBSTETRICS AND GYNECOLOGY | Facility: CLINIC | Age: 36
End: 2021-03-05

## 2021-03-05 VITALS
WEIGHT: 169 LBS | SYSTOLIC BLOOD PRESSURE: 120 MMHG | HEIGHT: 72 IN | DIASTOLIC BLOOD PRESSURE: 72 MMHG | BODY MASS INDEX: 22.89 KG/M2

## 2021-03-05 DIAGNOSIS — Z30.014 ENCOUNTER FOR INITIAL PRESCRIPTION OF INTRAUTERINE CONTRACEPTIVE DEVICE (IUD): ICD-10-CM

## 2021-03-05 PROBLEM — O09.523 MULTIGRAVIDA OF ADVANCED MATERNAL AGE IN THIRD TRIMESTER: Status: RESOLVED | Noted: 2020-11-02 | Resolved: 2021-03-05

## 2021-03-05 PROBLEM — O36.63X0 EXCESSIVE FETAL GROWTH AFFECTING MANAGEMENT OF MOTHER IN THIRD TRIMESTER, ANTEPARTUM: Status: RESOLVED | Noted: 2020-12-23 | Resolved: 2021-03-05

## 2021-03-05 PROCEDURE — 0503F POSTPARTUM CARE VISIT: CPT | Performed by: MIDWIFE

## 2021-03-05 NOTE — PROGRESS NOTES
"Subjective   Chief Complaint   Patient presents with   • Postpartum Care     6 week postpartum,  21, last pap 20 WNL, wants to discuss birth control options. C/O mastitis left breast.     Bel Brown is a 35 y.o. year old  presenting to be seen for her postpartum visit.  She had a vaginal delivery.    Since delivery she has not been sexually active.   She does have some concerns about post-partum blues/depression. She states they are related to frustrations of breastfeeding. She has a hx of anxiety and will be seeing PCP for continuation of meds.  She is pumping her breasts and feeding with bottle. She had Mastitis in right breast shortly after delivery and now has it in left breast. She is wanting to wean the baby.  For ongoing contraception, her plans are IUD - Mirena.  She has not had a menstrual cycle.    Social History    Tobacco Use      Smoking status: Former Smoker        Packs/day: 0.05        Years: 10.00        Pack years: .5        Types: Cigarettes        Quit date: 2020        Years since quittin.8      Smokeless tobacco: Never Used         Objective   /72   Ht 182.9 cm (72\")   Wt 76.7 kg (169 lb)   LMP 2020 (Approximate)   BMI 22.92 kg/m²     General:  well developed; well nourished  no acute distress   Abdomen: soft, non-tender; no masses  no umbilical or inguinal hernias are present   Pelvis: External genitalia:  normal appearance of the external genitalia including Bartholin's and Blue Diamond's glands.  Uterus:  normal size, shape and consistency.  Adnexa:  normal bimanual exam of the adnexa.        Assessment   1. Normal 6 week postpartum exam  2. Contraceptive management     Plan   1. BC options reviewed and compared today: IUD - Mirena  2. Pap smear not due  3. Follow up for Mirena insertion   4. Discussed how to wean from breast, gradually take away feedings.    New Medications Ordered This Visit   Medications   • levonorgestrel (Mirena, 52 MG,) 20 " MCG/24HR IUD     Sig: Take to office as directed     Dispense:  1 each     Refill:  0          This note was electronically signed.  NIK Agosto  3/5/2021

## 2021-03-17 ENCOUNTER — IMMUNIZATION (OUTPATIENT)
Dept: VACCINE CLINIC | Facility: HOSPITAL | Age: 36
End: 2021-03-17

## 2021-03-17 PROCEDURE — 91300 HC SARSCOV02 VAC 30MCG/0.3ML IM: CPT | Performed by: INTERNAL MEDICINE

## 2021-03-17 PROCEDURE — 0001A: CPT | Performed by: INTERNAL MEDICINE

## 2021-03-23 RX ORDER — FERROUS SULFATE 325(65) MG
TABLET ORAL
Qty: 60 TABLET | Refills: 1 | OUTPATIENT
Start: 2021-03-23

## 2021-04-07 ENCOUNTER — IMMUNIZATION (OUTPATIENT)
Dept: VACCINE CLINIC | Facility: HOSPITAL | Age: 36
End: 2021-04-07

## 2021-04-07 PROCEDURE — 91300 HC SARSCOV02 VAC 30MCG/0.3ML IM: CPT | Performed by: INTERNAL MEDICINE

## 2021-04-07 PROCEDURE — 0002A: CPT | Performed by: INTERNAL MEDICINE

## 2021-05-27 ENCOUNTER — TELEPHONE (OUTPATIENT)
Dept: INTERNAL MEDICINE | Facility: CLINIC | Age: 36
End: 2021-05-27

## 2021-05-27 NOTE — TELEPHONE ENCOUNTER
Caller: Bel Brown    Relationship to patient: Self    Best call back number: [phone]    Chief complaint: PHYSICAL    Type of visit: PHYSICAL    Requested date: 05/27/2021-06/04/2021    If rescheduling, when is the original appointment: 11/15/2021    Additional notes: THE PATIENT STATED SHE HAS JUST BEEN ACCEPTED INTO A GRADUATE PROGRAM AND NEEDS A PHYSICAL DONE AND THE PAPERWORK TURNED IN BY 06/07/2021. PATIENT WOULD LIKE A CALL BACK ASAP.

## 2021-05-27 NOTE — TELEPHONE ENCOUNTER
Patient has physical done 11/10/20, can paperwork be completed from that visit or will she need another office visit to complete forms?

## 2021-05-28 NOTE — TELEPHONE ENCOUNTER
Spoke with patient, states form does not have to be signed by Dr. Hansen just needs some information from her physical that was done in November and faxed. Patient is going to fax me paperwork to complete and fax.    I have personally performed a face to face diagnostic evaluation on this patient. I have reviewed the ACP note and agree with the history, exam and plan of care, except as noted.

## 2021-06-09 ENCOUNTER — OFFICE VISIT (OUTPATIENT)
Dept: INTERNAL MEDICINE | Facility: CLINIC | Age: 36
End: 2021-06-09

## 2021-06-09 VITALS
SYSTOLIC BLOOD PRESSURE: 96 MMHG | HEART RATE: 60 BPM | WEIGHT: 164 LBS | BODY MASS INDEX: 22.21 KG/M2 | TEMPERATURE: 97.5 F | RESPIRATION RATE: 14 BRPM | OXYGEN SATURATION: 99 % | DIASTOLIC BLOOD PRESSURE: 65 MMHG | HEIGHT: 72 IN

## 2021-06-09 DIAGNOSIS — I95.9 TRANSIENT HYPOTENSION: Primary | ICD-10-CM

## 2021-06-09 PROCEDURE — 99213 OFFICE O/P EST LOW 20 MIN: CPT | Performed by: INTERNAL MEDICINE

## 2021-06-09 NOTE — PATIENT INSTRUCTIONS
MyPlate from USDA    MyPlate is an outline of a general healthy diet based on the 2010 Dietary Guidelines for Americans, from the U.S. Department of Agriculture (USDA). It sets guidelines for how much food you should eat from each food group based on your age, sex, and level of physical activity.  What are tips for following MyPlate?  To follow MyPlate recommendations:  · Eat a wide variety of fruits and vegetables, grains, and protein foods.  · Serve smaller portions and eat less food throughout the day.  · Limit portion sizes to avoid overeating.  · Enjoy your food.  · Get at least 150 minutes of exercise every week. This is about 30 minutes each day, 5 or more days per week.  It can be difficult to have every meal look like MyPlate. Think about MyPlate as eating guidelines for an entire day, rather than each individual meal.  Fruits and vegetables  · Make half of your plate fruits and vegetables.  · Eat many different colors of fruits and vegetables each day.  · For a 2,000 calorie daily food plan, eat:  ? 2½ cups of vegetables every day.  ? 2 cups of fruit every day.  · 1 cup is equal to:  ? 1 cup raw or cooked vegetables.  ? 1 cup raw fruit.  ? 1 medium-sized orange, apple, or banana.  ? 1 cup 100% fruit or vegetable juice.  ? 2 cups raw leafy greens, such as lettuce, spinach, or kale.  ? ½ cup dried fruit.  Grains  · One fourth of your plate should be grains.  · Make at least half of the grains you eat each day whole grains.  · For a 2,000 calorie daily food plan, eat 6 oz of grains every day.  · 1 oz is equal to:  ? 1 slice bread.  ? 1 cup cereal.  ? ½ cup cooked rice, cereal, or pasta.  Protein  · One fourth of your plate should be protein.  · Eat a wide variety of protein foods, including meat, poultry, fish, eggs, beans, nuts, and tofu.  · For a 2,000 calorie daily food plan, eat 5½ oz of protein every day.  · 1 oz is equal to:  ? 1 oz meat, poultry, or fish.  ? ¼ cup cooked beans.  ? 1 egg.  ? ½ oz nuts  or seeds.  ? 1 Tbsp peanut butter.  Dairy  · Drink fat-free or low-fat (1%) milk.  · Eat or drink dairy as a side to meals.  · For a 2,000 calorie daily food plan, eat or drink 3 cups of dairy every day.  · 1 cup is equal to:  ? 1 cup milk, yogurt, cottage cheese, or soy milk (soy beverage).  ? 2 oz processed cheese.  ? 1½ oz natural cheese.  Fats, oils, salt, and sugars  · Only small amounts of oils are recommended.  · Avoid foods that are high in calories and low in nutritional value (empty calories), like foods high in fat or added sugars.  · Choose foods that are low in salt (sodium). Choose foods that have less than 140 milligrams (mg) of sodium per serving.  · Drink water instead of sugary drinks. Drink enough water each day to keep your urine pale yellow.  Where to find support  · Work with your health care provider or a nutrition specialist (dietitian) to develop a customized eating plan that is right for you.  · Download an michela (mobile application) to help you track your daily food intake.  Where to find more information  · Go to ChooseMyPlate.gov for more information.  Summary  · MyPlate is a general guideline for healthy eating from the USDA. It is based on the 2010 Dietary Guidelines for Americans.  · In general, fruits and vegetables should take up ½ of your plate, grains should take up ¼ of your plate, and protein should take up ¼ of your plate.  This information is not intended to replace advice given to you by your health care provider. Make sure you discuss any questions you have with your health care provider.  Document Revised: 05/21/2020 Document Reviewed: 03/19/2018  Elsevier Patient Education © 2021 Elsevier Inc.

## 2021-06-09 NOTE — PROGRESS NOTES
"Chief Complaint  Follow-up    Subjective          Bel Brown presents to [facility]  History of Present Illness  Patient is here to follow-up on her blood pressure which is running on the lower side, she recently had a baby who was 5 months old, currently she is only using albuterol, Flonase and MiraLAX as needed, she is not breast-feeding recently she is brought in paperwork for her MSN  program at [facility]    Objective   Vital Signs:   BP 96/65   Pulse 60   Temp 97.5 °F (36.4 °C)   Resp 14   Ht 182.9 cm (72.01\")   Wt 74.4 kg (164 lb)   SpO2 99%   BMI 22.24 kg/m²     Physical Exam  Vitals and nursing note reviewed.   Constitutional:       General: She is not in acute distress.     Appearance: Normal appearance. She is not diaphoretic.   HENT:      Head: Normocephalic and atraumatic.      Right Ear: External ear normal.      Left Ear: External ear normal.      Nose: Nose normal.   Eyes:      Extraocular Movements: Extraocular movements intact.      Conjunctiva/sclera: Conjunctivae normal.   Neck:      Trachea: Trachea normal.   Cardiovascular:      Rate and Rhythm: Normal rate and regular rhythm.      Heart sounds: Normal heart sounds.   Pulmonary:      Effort: Pulmonary effort is normal. No respiratory distress.      Breath sounds: Normal breath sounds.   Abdominal:      General: Abdomen is flat.   Musculoskeletal:      Cervical back: Neck supple.      Comments: Moves all limbs   Skin:     General: Skin is warm.   Neurological:      Mental Status: She is alert and oriented to person, place, and time.      Comments: No gross motor or sensory deficits        Result Review :     Common labs    Common Labsle 11/2/20 1/23/21 1/25/21   WBC 9.86 9.14 15.68 (A)   Hemoglobin 11.5 (A) 9.8 (A) 9.4 (A)   Hematocrit 32.7 (A) 29.2 (A) 28.0 (A)   Platelets 234 181 179   (A) Abnormal value                      Assessment and Plan    Diagnoses and all orders for this visit:    1. Transient " hypotension (Primary)    Plan:  1.transient hypotension: Oral hydration advised, labs reviewed, paperwork completed for patient's MSN program at [facility]  , copy has been made and scanned into chart  I spent 20 minutes caring for Bel on this date of service. This time includes time spent by me in the following activities:preparing for the visit, reviewing tests, performing a medically appropriate examination and/or evaluation , counseling and educating the patient/family/caregiver, ordering medications, tests, or procedures and documenting information in the medical record  Follow Up   Patient was given instructions and counseling regarding her condition or for health maintenance advice. Please see specific information pulled into the AVS if appropriate.

## 2021-09-03 ENCOUNTER — LAB (OUTPATIENT)
Dept: LAB | Facility: HOSPITAL | Age: 36
End: 2021-09-03

## 2021-09-03 ENCOUNTER — TRANSCRIBE ORDERS (OUTPATIENT)
Dept: LAB | Facility: HOSPITAL | Age: 36
End: 2021-09-03

## 2021-09-03 DIAGNOSIS — Z20.822 COVID-19 RULED OUT: Primary | ICD-10-CM

## 2021-09-03 DIAGNOSIS — Z20.822 COVID-19 RULED OUT: ICD-10-CM

## 2021-09-03 LAB — SARS-COV-2 RNA NOSE QL NAA+PROBE: NOT DETECTED

## 2021-09-03 PROCEDURE — U0004 COV-19 TEST NON-CDC HGH THRU: HCPCS

## 2021-09-03 PROCEDURE — C9803 HOPD COVID-19 SPEC COLLECT: HCPCS

## 2021-11-15 ENCOUNTER — OFFICE VISIT (OUTPATIENT)
Dept: INTERNAL MEDICINE | Facility: CLINIC | Age: 36
End: 2021-11-15

## 2021-11-15 VITALS
HEIGHT: 72 IN | BODY MASS INDEX: 22.08 KG/M2 | OXYGEN SATURATION: 98 % | WEIGHT: 163 LBS | SYSTOLIC BLOOD PRESSURE: 123 MMHG | HEART RATE: 72 BPM | TEMPERATURE: 97.5 F | RESPIRATION RATE: 14 BRPM | DIASTOLIC BLOOD PRESSURE: 77 MMHG

## 2021-11-15 DIAGNOSIS — Z00.00 ROUTINE GENERAL MEDICAL EXAMINATION AT A HEALTH CARE FACILITY: Primary | ICD-10-CM

## 2021-11-15 PROCEDURE — 99395 PREV VISIT EST AGE 18-39: CPT | Performed by: INTERNAL MEDICINE
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
## 2022-01-19 ENCOUNTER — LAB (OUTPATIENT)
Dept: LAB | Facility: HOSPITAL | Age: 37
End: 2022-01-19

## 2022-01-19 DIAGNOSIS — Z03.818 ENCOUNTER FOR PATIENT CONCERN ABOUT EXPOSURE TO INFECTIOUS ORGANISM: Primary | ICD-10-CM

## 2022-01-19 LAB — SARS-COV-2 RNA NOSE QL NAA+PROBE: NOT DETECTED

## 2022-01-19 PROCEDURE — U0004 COV-19 TEST NON-CDC HGH THRU: HCPCS

## 2022-04-30 LAB
25(OH)D3+25(OH)D2 SERPL-MCNC: 18.1 NG/ML (ref 30–100)
ALBUMIN SERPL-MCNC: 4.4 G/DL (ref 3.5–5.2)
ALBUMIN/GLOB SERPL: 1.8 G/DL
ALP SERPL-CCNC: 55 U/L (ref 39–117)
ALT SERPL-CCNC: 12 U/L (ref 1–33)
AST SERPL-CCNC: 18 U/L (ref 1–32)
BASOPHILS # BLD AUTO: 0.05 10*3/MM3 (ref 0–0.2)
BASOPHILS NFR BLD AUTO: 1 % (ref 0–1.5)
BILIRUB SERPL-MCNC: 0.6 MG/DL (ref 0–1.2)
BUN SERPL-MCNC: 9 MG/DL (ref 6–20)
BUN/CREAT SERPL: 13 (ref 7–25)
CALCIUM SERPL-MCNC: 9.4 MG/DL (ref 8.6–10.5)
CHLORIDE SERPL-SCNC: 106 MMOL/L (ref 98–107)
CHOLEST SERPL-MCNC: 199 MG/DL (ref 0–200)
CO2 SERPL-SCNC: 21.2 MMOL/L (ref 22–29)
CREAT SERPL-MCNC: 0.69 MG/DL (ref 0.57–1)
EGFRCR SERPLBLD CKD-EPI 2021: 115.5 ML/MIN/1.73
EOSINOPHIL # BLD AUTO: 0.12 10*3/MM3 (ref 0–0.4)
EOSINOPHIL NFR BLD AUTO: 2.3 % (ref 0.3–6.2)
ERYTHROCYTE [DISTWIDTH] IN BLOOD BY AUTOMATED COUNT: 11.6 % (ref 12.3–15.4)
GLOBULIN SER CALC-MCNC: 2.4 GM/DL
GLUCOSE SERPL-MCNC: 85 MG/DL (ref 65–99)
HBA1C MFR BLD: 4.7 % (ref 4.8–5.6)
HCT VFR BLD AUTO: 41.7 % (ref 34–46.6)
HDLC SERPL-MCNC: 69 MG/DL (ref 40–60)
HGB BLD-MCNC: 14 G/DL (ref 12–15.9)
IMM GRANULOCYTES # BLD AUTO: 0.01 10*3/MM3 (ref 0–0.05)
IMM GRANULOCYTES NFR BLD AUTO: 0.2 % (ref 0–0.5)
LDLC SERPL CALC-MCNC: 118 MG/DL (ref 0–100)
LYMPHOCYTES # BLD AUTO: 1.4 10*3/MM3 (ref 0.7–3.1)
LYMPHOCYTES NFR BLD AUTO: 26.7 % (ref 19.6–45.3)
MCH RBC QN AUTO: 30.9 PG (ref 26.6–33)
MCHC RBC AUTO-ENTMCNC: 33.6 G/DL (ref 31.5–35.7)
MCV RBC AUTO: 92.1 FL (ref 79–97)
MONOCYTES # BLD AUTO: 0.56 10*3/MM3 (ref 0.1–0.9)
MONOCYTES NFR BLD AUTO: 10.7 % (ref 5–12)
NEUTROPHILS # BLD AUTO: 3.11 10*3/MM3 (ref 1.7–7)
NEUTROPHILS NFR BLD AUTO: 59.1 % (ref 42.7–76)
NRBC BLD AUTO-RTO: 0 /100 WBC (ref 0–0.2)
PLATELET # BLD AUTO: 218 10*3/MM3 (ref 140–450)
POTASSIUM SERPL-SCNC: 4.6 MMOL/L (ref 3.5–5.2)
PROT SERPL-MCNC: 6.8 G/DL (ref 6–8.5)
RBC # BLD AUTO: 4.53 10*6/MM3 (ref 3.77–5.28)
SODIUM SERPL-SCNC: 139 MMOL/L (ref 136–145)
TRIGL SERPL-MCNC: 67 MG/DL (ref 0–150)
TSH SERPL DL<=0.005 MIU/L-ACNC: 1.16 UIU/ML (ref 0.27–4.2)
VIT B12 SERPL-MCNC: 892 PG/ML (ref 211–946)
VLDLC SERPL CALC-MCNC: 12 MG/DL (ref 5–40)
WBC # BLD AUTO: 5.25 10*3/MM3 (ref 3.4–10.8)

## 2022-05-02 RX ORDER — ERGOCALCIFEROL 1.25 MG/1
50000 CAPSULE ORAL WEEKLY
Qty: 8 CAPSULE | Refills: 0 | Status: SHIPPED | OUTPATIENT
Start: 2022-05-02

## 2024-02-01 ENCOUNTER — OFFICE VISIT (OUTPATIENT)
Dept: OBSTETRICS AND GYNECOLOGY | Facility: CLINIC | Age: 39
End: 2024-02-01
Payer: COMMERCIAL

## 2024-02-01 VITALS
DIASTOLIC BLOOD PRESSURE: 70 MMHG | WEIGHT: 151 LBS | SYSTOLIC BLOOD PRESSURE: 120 MMHG | BODY MASS INDEX: 21.62 KG/M2 | HEIGHT: 70 IN

## 2024-02-01 DIAGNOSIS — N64.4 PAIN OF LEFT BREAST: Primary | ICD-10-CM

## 2024-02-01 PROCEDURE — 99212 OFFICE O/P EST SF 10 MIN: CPT | Performed by: PHYSICIAN ASSISTANT
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
## 2024-05-07 ENCOUNTER — INITIAL PRENATAL (OUTPATIENT)
Dept: OBSTETRICS AND GYNECOLOGY | Facility: CLINIC | Age: 39
End: 2024-05-07
Payer: COMMERCIAL

## 2024-05-07 VITALS — SYSTOLIC BLOOD PRESSURE: 116 MMHG | BODY MASS INDEX: 22.67 KG/M2 | DIASTOLIC BLOOD PRESSURE: 74 MMHG | WEIGHT: 158 LBS

## 2024-05-07 DIAGNOSIS — O09.522 MULTIGRAVIDA OF ADVANCED MATERNAL AGE IN SECOND TRIMESTER: Primary | ICD-10-CM

## 2024-05-07 DIAGNOSIS — O34.41 HISTORY OF LOOP ELECTROSURGICAL EXCISION PROCEDURE (LEEP) OF CERVIX AFFECTING PREGNANCY IN FIRST TRIMESTER: ICD-10-CM

## 2024-05-07 DIAGNOSIS — Z98.890 HISTORY OF LOOP ELECTROSURGICAL EXCISION PROCEDURE (LEEP) OF CERVIX AFFECTING PREGNANCY IN FIRST TRIMESTER: ICD-10-CM

## 2024-05-07 DIAGNOSIS — O36.80X0 ENCOUNTER TO DETERMINE FETAL VIABILITY OF PREGNANCY, SINGLE OR UNSPECIFIED FETUS: ICD-10-CM

## 2024-05-07 PROBLEM — O09.529 AMA (ADVANCED MATERNAL AGE) MULTIGRAVIDA 35+: Status: ACTIVE | Noted: 2024-05-07

## 2024-05-07 PROBLEM — O34.40 HX LEEP (LOOP ELECTROSURGICAL EXCISION PROCEDURE), CERVIX, PREGNANCY: Status: ACTIVE | Noted: 2024-05-07

## 2024-05-07 PROCEDURE — 0501F PRENATAL FLOW SHEET: CPT | Performed by: MIDWIFE

## 2024-05-11 LAB
ABO GROUP BLD: NORMAL
AMPHETAMINES UR QL SCN: NEGATIVE NG/ML
BARBITURATES UR QL SCN: NEGATIVE NG/ML
BASOPHILS # BLD AUTO: 0 X10E3/UL (ref 0–0.2)
BASOPHILS NFR BLD AUTO: 0 %
BENZODIAZ UR QL SCN: NEGATIVE NG/ML
BLD GP AB SCN SERPL QL: NEGATIVE
BZE UR QL SCN: NEGATIVE NG/ML
C TRACH RRNA SPEC QL NAA+PROBE: NEGATIVE
CANNABINOIDS UR QL SCN: NEGATIVE NG/ML
CFDNA.FET/CFDNA.TOTAL SFR FETUS: NORMAL %
CITATION REF LAB TEST: NORMAL
CREAT UR-MCNC: 138.2 MG/DL (ref 20–300)
EOSINOPHIL # BLD AUTO: 0.1 X10E3/UL (ref 0–0.4)
EOSINOPHIL NFR BLD AUTO: 1 %
ERYTHROCYTE [DISTWIDTH] IN BLOOD BY AUTOMATED COUNT: 12.8 % (ref 11.7–15.4)
FET 13+18+21+X+Y ANEUP PLAS.CFDNA: NEGATIVE
FET CHR 21 TS PLAS.CFDNA QL: NEGATIVE
FET MS X RISK WBC.DNA+CFDNA QL: NOT DETECTED
FET SEX PLAS.CFDNA DOSAGE CFDNA: NORMAL
FET TS 13 RISK PLAS.CFDNA QL: NEGATIVE
FET TS 18 RISK WBC.DNA+CFDNA QL: NEGATIVE
FET X + Y ANEUP RISK PLAS.CFDNA SEQ-IMP: NOT DETECTED
GA EST FROM CONCEPTION DATE: NORMAL D
GESTATIONAL AGE > 9:: YES
HBV SURFACE AG SERPL QL IA: NEGATIVE
HCT VFR BLD AUTO: 38.5 % (ref 34–46.6)
HCV IGG SERPL QL IA: NON REACTIVE
HGB BLD-MCNC: 12.6 G/DL (ref 11.1–15.9)
HIV 1+2 AB+HIV1 P24 AG SERPL QL IA: NON REACTIVE
IMM GRANULOCYTES # BLD AUTO: 0 X10E3/UL (ref 0–0.1)
IMM GRANULOCYTES NFR BLD AUTO: 0 %
LAB DIRECTOR NAME PROVIDER: NORMAL
LAB DIRECTOR NAME PROVIDER: NORMAL
LABORATORY COMMENT REPORT: NORMAL
LABORATORY COMMENT REPORT: NORMAL
LIMITATIONS OF THE TEST: NORMAL
LYMPHOCYTES # BLD AUTO: 1.5 X10E3/UL (ref 0.7–3.1)
LYMPHOCYTES NFR BLD AUTO: 16 %
MCH RBC QN AUTO: 30.4 PG (ref 26.6–33)
MCHC RBC AUTO-ENTMCNC: 32.7 G/DL (ref 31.5–35.7)
MCV RBC AUTO: 93 FL (ref 79–97)
METHADONE UR QL SCN: NEGATIVE NG/ML
MONOCYTES # BLD AUTO: 0.5 X10E3/UL (ref 0.1–0.9)
MONOCYTES NFR BLD AUTO: 6 %
N GONORRHOEA RRNA SPEC QL NAA+PROBE: NEGATIVE
NEGATIVE PREDICTIVE VALUE: NORMAL
NEUTROPHILS # BLD AUTO: 6.7 X10E3/UL (ref 1.4–7)
NEUTROPHILS NFR BLD AUTO: 77 %
NOTE: NORMAL
OPIATES UR QL SCN: NEGATIVE NG/ML
OXYCODONE+OXYMORPHONE UR QL SCN: NEGATIVE NG/ML
PCP UR QL: NEGATIVE NG/ML
PERFORMANCE CHARACTERISTICS: NORMAL
PH UR: 6.5 [PH] (ref 4.5–8.9)
PLATELET # BLD AUTO: 244 X10E3/UL (ref 150–450)
POSITIVE PREDICTIVE VALUE: NORMAL
PROPOXYPH UR QL SCN: NEGATIVE NG/ML
RBC # BLD AUTO: 4.15 X10E6/UL (ref 3.77–5.28)
REF LAB TEST METHOD: NORMAL
RH BLD: POSITIVE
RPR SER QL: NON REACTIVE
RUBV IGG SERPL IA-ACNC: 1.73 INDEX
TEST PERFORMANCE INFO SPEC: NORMAL
WBC # BLD AUTO: 8.8 X10E3/UL (ref 3.4–10.8)

## 2024-06-05 ENCOUNTER — ROUTINE PRENATAL (OUTPATIENT)
Dept: OBSTETRICS AND GYNECOLOGY | Facility: CLINIC | Age: 39
End: 2024-06-05
Payer: COMMERCIAL

## 2024-06-05 VITALS — SYSTOLIC BLOOD PRESSURE: 120 MMHG | DIASTOLIC BLOOD PRESSURE: 78 MMHG | WEIGHT: 166.6 LBS | BODY MASS INDEX: 23.9 KG/M2

## 2024-06-05 DIAGNOSIS — O09.522 MULTIGRAVIDA OF ADVANCED MATERNAL AGE IN SECOND TRIMESTER: ICD-10-CM

## 2024-06-05 DIAGNOSIS — O34.42 HISTORY OF LEEP (LOOP ELECTROSURGICAL EXCISION PROCEDURE) OF CERVIX COMPLICATING PREGNANCY IN SECOND TRIMESTER: ICD-10-CM

## 2024-06-05 DIAGNOSIS — Z98.890 HISTORY OF LEEP (LOOP ELECTROSURGICAL EXCISION PROCEDURE) OF CERVIX COMPLICATING PREGNANCY IN SECOND TRIMESTER: ICD-10-CM

## 2024-06-05 DIAGNOSIS — Z34.82 ENCOUNTER FOR SUPERVISION OF OTHER NORMAL PREGNANCY IN SECOND TRIMESTER: Primary | ICD-10-CM

## 2024-06-05 RX ORDER — PREDNISONE 20 MG/1
TABLET ORAL
COMMUNITY
Start: 2024-03-09 | End: 2024-06-05

## 2024-06-05 RX ORDER — CEFDINIR 300 MG/1
2 CAPSULE ORAL DAILY
COMMUNITY
Start: 2024-05-08 | End: 2024-06-05

## 2024-06-05 RX ORDER — MONTELUKAST SODIUM 10 MG/1
1 TABLET ORAL DAILY
COMMUNITY
Start: 2024-03-09 | End: 2024-06-05

## 2024-06-05 RX ORDER — FLUTICASONE PROPIONATE AND SALMETEROL 250; 50 UG/1; UG/1
POWDER RESPIRATORY (INHALATION)
COMMUNITY
Start: 2024-03-03

## 2024-06-05 RX ORDER — OFLOXACIN 3 MG/ML
SOLUTION/ DROPS OPHTHALMIC
COMMUNITY
Start: 2024-05-08 | End: 2024-06-05

## 2024-06-05 NOTE — PROGRESS NOTES
"Prenatal Care Visit    Subjective   Chief Complaint   Patient presents with    Routine Prenatal Visit     Ob follow-up. No complaints patient is doing well.      History:   Bel is a  currently at 16w1d who presents for a prenatal care visit today.    Reports some nausea, though it has improved since the first trimester. Denies VB. Has been struggling with allergies. Starting to feel fetal movements. Reports fatigue and \"pregnancy brain fog.\"     Objective   /78   Wt 75.6 kg (166 lb 9.6 oz)   LMP 2024   BMI 23.90 kg/m²   Physical Exam:  Normal, gestational age-appropriate exam today      Assessment & Plan     IUP @ 16w1d  Routine care: I have reviewed the prenatal labs and ultrasound(s) today. I have reviewed the most recent prenatal progress note(s).   H/o LEEP: prior to G1, G1 was term  AMA: cfDNA wnl  Asthma: albuterol PRN      Diagnosis Plan   1. Encounter for supervision of other normal pregnancy in second trimester        2. History of LEEP (loop electrosurgical excision procedure) of cervix complicating pregnancy in second trimester        3. Multigravida of advanced maternal age in second trimester           Medication Management: continue PNV    Topics discussed: Prenatal care milestones  Kick counts and fetal movement   labor signs and symptoms   Tests next visit: U/S for anatomic screening   Next visit: 3-4 week(s)     Roselia Torres MD  Obstetrics and Gynecology  [facility]  "
denies pain/discomfort

## 2024-07-01 ENCOUNTER — ROUTINE PRENATAL (OUTPATIENT)
Dept: OBSTETRICS AND GYNECOLOGY | Facility: CLINIC | Age: 39
End: 2024-07-01
Payer: COMMERCIAL

## 2024-07-01 VITALS — DIASTOLIC BLOOD PRESSURE: 82 MMHG | WEIGHT: 170 LBS | SYSTOLIC BLOOD PRESSURE: 122 MMHG | BODY MASS INDEX: 24.39 KG/M2

## 2024-07-01 DIAGNOSIS — Z98.890 HISTORY OF LOOP ELECTROSURGICAL EXCISION PROCEDURE (LEEP) OF CERVIX AFFECTING PREGNANCY IN SECOND TRIMESTER: ICD-10-CM

## 2024-07-01 DIAGNOSIS — M21.541 ACQUIRED BILATERAL CLUB FEET: ICD-10-CM

## 2024-07-01 DIAGNOSIS — M21.542 ACQUIRED BILATERAL CLUB FEET: ICD-10-CM

## 2024-07-01 DIAGNOSIS — Z34.92 PRENATAL CARE IN SECOND TRIMESTER: Primary | ICD-10-CM

## 2024-07-01 DIAGNOSIS — Z36.89 ENCOUNTER FOR FETAL ANATOMIC SURVEY: ICD-10-CM

## 2024-07-01 DIAGNOSIS — O34.42 HISTORY OF LOOP ELECTROSURGICAL EXCISION PROCEDURE (LEEP) OF CERVIX AFFECTING PREGNANCY IN SECOND TRIMESTER: ICD-10-CM

## 2024-07-01 DIAGNOSIS — O09.522 MULTIGRAVIDA OF ADVANCED MATERNAL AGE IN SECOND TRIMESTER: ICD-10-CM

## 2024-07-01 PROCEDURE — 0502F SUBSEQUENT PRENATAL CARE: CPT | Performed by: OBSTETRICS & GYNECOLOGY

## 2024-07-01 NOTE — PROGRESS NOTES
Prenatal Care Visit    Subjective   Chief Complaint   Patient presents with    Routine Prenatal Visit     Anatomy scan, no complaints.       History:   Bel is a  currently at 19w6d who presents for a prenatal care visit today.    No issues.    Social History    Tobacco Use      Smoking status: Former        Packs/day: 0.00        Years: 0.1 packs/day for 10.0 years (0.5 ttl pk-yrs)        Types: Cigarettes        Start date: 2010        Quit date: 2020        Years since quittin.1      Smokeless tobacco: Never       Objective   /82   Wt 77.1 kg (170 lb)   LMP 2024   BMI 24.39 kg/m²   Physical Exam:  Normal, gestational age-appropriate exam today        Plan   Medical Decision Making:    I have reviewed the prenatal labs and ultrasound(s) today. I have reviewed the most recent prenatal progress note(s).    Diagnosis: Supervision of high risk pregnancy  AMA, NIPS NEG  Asthma  Bilateral fetal club feet  H/o LEEP   Tests/Orders/Rx today: Orders Placed This Encounter   Procedures    US Ob 14 + Weeks Single or First Gestation     Order Specific Question:   Reason for Exam:     Answer:   anatomy scan     Order Specific Question:   Release to patient     Answer:   Routine Release [[id number]]    [facility]     Standing Status:   Future     Standing Expiration Date:   2025     Order Specific Question:   Reason for Exam:     Answer:   Bilateral clubbed feet     Order Specific Question:   Release to patient     Answer:   Routine Release [[id number]]       Medication Management: None     Topics discussed: Prenatal care milestones  kick counts and fetal movement   labor signs and symptoms  U/S findings  MFM referral   Tests next visit: none   Next visit: 4 week(s)     Louie Ocnonor MD  Obstetrics and Gynecology  [facility]

## 2024-07-23 ENCOUNTER — HOSPITAL ENCOUNTER (OUTPATIENT)
Dept: WOMENS IMAGING | Facility: HOSPITAL | Age: 39
Discharge: HOME | End: 2024-07-23
Admitting: OBSTETRICS & GYNECOLOGY
Payer: COMMERCIAL

## 2024-07-23 ENCOUNTER — OFFICE VISIT (OUTPATIENT)
Dept: OBSTETRICS AND GYNECOLOGY | Facility: HOSPITAL | Age: 39
End: 2024-07-23
Payer: COMMERCIAL

## 2024-07-23 VITALS — BODY MASS INDEX: 25.4 KG/M2 | SYSTOLIC BLOOD PRESSURE: 120 MMHG | DIASTOLIC BLOOD PRESSURE: 76 MMHG | WEIGHT: 177 LBS

## 2024-07-23 DIAGNOSIS — O44.40 LOW LYING PLACENTA, ANTEPARTUM: ICD-10-CM

## 2024-07-23 DIAGNOSIS — Z34.90 PREGNANCY, UNSPECIFIED GESTATIONAL AGE: ICD-10-CM

## 2024-07-23 DIAGNOSIS — O09.529 ANTEPARTUM MULTIGRAVIDA OF ADVANCED MATERNAL AGE: Primary | ICD-10-CM

## 2024-07-23 DIAGNOSIS — M21.541 ACQUIRED BILATERAL CLUB FEET: ICD-10-CM

## 2024-07-23 DIAGNOSIS — M21.542 ACQUIRED BILATERAL CLUB FEET: ICD-10-CM

## 2024-07-23 PROCEDURE — 76811 OB US DETAILED SNGL FETUS: CPT | Performed by: OBSTETRICS & GYNECOLOGY

## 2024-07-23 PROCEDURE — 99203 OFFICE O/P NEW LOW 30 MIN: CPT | Performed by: OBSTETRICS & GYNECOLOGY

## 2024-07-23 PROCEDURE — 76811 OB US DETAILED SNGL FETUS: CPT

## 2024-07-23 NOTE — ASSESSMENT & PLAN NOTE
The patient will be 39 years old at the time of delivery.  She was quoted the following age-related risks at term:  Risk for Down syndrome 1 in 140, risk for any chromosomal abnormality 1 in 80.  Options in genetic testing and genetic screening were discussed with the patient.  I noted an option of genetic testing in the 2nd trimester of transabdominal amniocentesis for the determination of fetal chromosomal complement as well as amniotic fluid alpha-fetoprotein for the evaluation of a fetal open neural tube defect.  A procedure-related fetal loss rate of 1 in 500 would be quoted with midtrimester amniocentesis.  I also discussed the option of analysis of cell-free fetal DNA in maternal blood.  I noted this directed analysis measures the relative proportion of chromosomes with the detection rate of fetal Down syndrome quoted as 99% with a false positive rate of less than .1%.  Screening for other fetal aneuploidies is also possible but the detection rate is lower with cell-free fetal DNA technology.  I then discussed the clinical utility of ultrasound and/or biochemical screening for the detection of fetal aneuploidy as well as fetal open neural tube defects.  Further, I have reviewed her self-reported family history and made suggestions as appropriate based on my review.      Patient is already had a cell free DNA screen that returned as low risk.  We discussed that this significantly lowers the risk of chromosomal abnormalities but does not eliminate risk.  Amniocentesis was again discussed.  The procedure was explained and risks including risk of pregnancy loss were outlined.  After careful consideration the patient declines amniocentesis.    We will rescan the patient again at 32 weeks gestation to look for late findings of trisomy as well as to assess fetal growth.  We will reevaluate the clubfeet again at that time.
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
## 2024-07-23 NOTE — ASSESSMENT & PLAN NOTE
Talipes (known as clubfoot) is one of the most common congenital foot and ankle problems, occurring in about one in 1,000 live births and in boys twice as often than in girls.  Diagnosis typically occurs via ultrasound at 20 weeks' gestation and is based on visualization of the foot in a plantarflexed and internally rotated position so the sole faces inward.  The Achilles tendon is contracted and the calcaneus is proximally displaced from its normal position.  The affected foot, calf and hallux may be smaller than the unaffected side. The imaging findings would include the long bones of the foot lying in the same plane as the tibia and fibula.  Approximately 60% are bilateral with 40% unilateral.  Clubfoot can occur as an isolated abnormality or as part of more than 200 chromosomal, genetic, or sporadic multiple malformation syndromes.  The differential diagnosis would include arthrogryposis, ectrodactyly, rockerbottom foot or deformation secondary to chronic oligohydramnios.   As it can occur secondary to lower extremity paralysis from a neural tube defect, an open neural tube defect must be ruled out by ultrasound or invasive testing.  For an isolated clubfoot, the long-term prognosis is good with 40% responding to conservative therapy and 60% requiring some surgical intervention.      Concerning the postdelivery management, in the [place] method, serial casting is the first step and involves gentle manipulation of the child's foot and application of a long leg cast.  Casts are changed weekly.  Typically five to seven casts are needed for correction.  In most cases, the Achilles tendon will need to be cut (Achilles tenotomy) in order to complete the correction. This is typically performed in the operating room under local anesthesia.  The child is casted for another three to four weeks and then fitted for a brace that will be worn 23 hours a day for three months and then at night until age 4 years.  The goal is to  slightly overcorrect the foot so that it will look normal at the end of treatment.

## 2024-07-23 NOTE — PROGRESS NOTES
Patient denies bleeding, leaking fluid or contractions  NIPT negative  Patients next follow up with Dr. Oconnor's office is 7/31/24
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
## 2024-07-31 ENCOUNTER — ROUTINE PRENATAL (OUTPATIENT)
Dept: OBSTETRICS AND GYNECOLOGY | Facility: CLINIC | Age: 39
End: 2024-07-31
Payer: COMMERCIAL

## 2024-07-31 VITALS — DIASTOLIC BLOOD PRESSURE: 72 MMHG | BODY MASS INDEX: 25.97 KG/M2 | SYSTOLIC BLOOD PRESSURE: 128 MMHG | WEIGHT: 181 LBS

## 2024-07-31 DIAGNOSIS — Z98.890 HISTORY OF LOOP ELECTROSURGICAL EXCISION PROCEDURE (LEEP) OF CERVIX AFFECTING PREGNANCY, ANTEPARTUM: ICD-10-CM

## 2024-07-31 DIAGNOSIS — O34.40 HISTORY OF LOOP ELECTROSURGICAL EXCISION PROCEDURE (LEEP) OF CERVIX AFFECTING PREGNANCY, ANTEPARTUM: ICD-10-CM

## 2024-07-31 DIAGNOSIS — Z34.92 PRENATAL CARE IN SECOND TRIMESTER: Primary | ICD-10-CM

## 2024-07-31 DIAGNOSIS — M21.542 ACQUIRED BILATERAL CLUB FEET: ICD-10-CM

## 2024-07-31 DIAGNOSIS — O09.529 ANTEPARTUM MULTIGRAVIDA OF ADVANCED MATERNAL AGE: ICD-10-CM

## 2024-07-31 DIAGNOSIS — M21.541 ACQUIRED BILATERAL CLUB FEET: ICD-10-CM

## 2024-08-01 NOTE — PROGRESS NOTES
Prenatal Care Visit    Subjective   Chief Complaint   Patient presents with    Routine Prenatal Visit     PDC follow up       History:   Bel is a  currently at 24w1d who presents for a prenatal care visit today.    No issues.    Social History    Tobacco Use      Smoking status: Former        Packs/day: 0.00        Years: 0.1 packs/day for 10.0 years (0.5 ttl pk-yrs)        Types: Cigarettes        Start date: 2010        Quit date: 2020        Years since quittin.2      Smokeless tobacco: Never       Objective   /72   Wt 82.1 kg (181 lb)   LMP 2024   BMI 25.97 kg/m²   Physical Exam:  Normal, gestational age-appropriate exam today        Plan   Medical Decision Making:    I have reviewed the prenatal labs and ultrasound(s) today. I have reviewed the most recent prenatal progress note(s).    Diagnosis: Supervision of high risk pregnancy  AMA, NIPS NEG  Asthma  Bilateral fetal club feet  H/o LEEP   Tests/Orders/Rx today: No orders of the defined types were placed in this encounter.      Medication Management: None     Topics discussed: Prenatal care milestones  kick counts and fetal movement   labor signs and symptoms  U/S findings  MFM following   Tests next visit: GCT  HgB   Next visit: 2 week(s)     Louie Oconnor MD  Obstetrics and Gynecology  [facility]

## 2024-08-12 ENCOUNTER — ROUTINE PRENATAL (OUTPATIENT)
Dept: OBSTETRICS AND GYNECOLOGY | Facility: CLINIC | Age: 39
End: 2024-08-12
Payer: COMMERCIAL

## 2024-08-12 VITALS — WEIGHT: 186.6 LBS | SYSTOLIC BLOOD PRESSURE: 114 MMHG | BODY MASS INDEX: 26.77 KG/M2 | DIASTOLIC BLOOD PRESSURE: 70 MMHG

## 2024-08-12 DIAGNOSIS — O35.9XX0 SUSPECTED FETAL ANOMALY, ANTEPARTUM, SINGLE OR UNSPECIFIED FETUS: ICD-10-CM

## 2024-08-12 DIAGNOSIS — Z34.92 PRENATAL CARE IN SECOND TRIMESTER: Primary | ICD-10-CM

## 2024-08-12 DIAGNOSIS — O09.529 ANTEPARTUM MULTIGRAVIDA OF ADVANCED MATERNAL AGE: ICD-10-CM

## 2024-08-12 DIAGNOSIS — O34.40 HISTORY OF LOOP ELECTROSURGICAL EXCISION PROCEDURE (LEEP) OF CERVIX AFFECTING PREGNANCY, ANTEPARTUM: ICD-10-CM

## 2024-08-12 DIAGNOSIS — O99.519 ASTHMA DURING PREGNANCY: ICD-10-CM

## 2024-08-12 DIAGNOSIS — Z98.890 HISTORY OF LOOP ELECTROSURGICAL EXCISION PROCEDURE (LEEP) OF CERVIX AFFECTING PREGNANCY, ANTEPARTUM: ICD-10-CM

## 2024-08-12 DIAGNOSIS — J45.909 ASTHMA DURING PREGNANCY: ICD-10-CM

## 2024-08-12 LAB
ERYTHROCYTE [DISTWIDTH] IN BLOOD BY AUTOMATED COUNT: 12.5 % (ref 12.3–15.4)
GLUCOSE 1H P 50 G GLC PO SERPL-MCNC: 137 MG/DL (ref 65–139)
HCT VFR BLD AUTO: 32.8 % (ref 34–46.6)
HGB BLD-MCNC: 11.1 G/DL (ref 12–15.9)
MCH RBC QN AUTO: 32.5 PG (ref 26.6–33)
MCHC RBC AUTO-ENTMCNC: 33.8 G/DL (ref 31.5–35.7)
MCV RBC AUTO: 95.9 FL (ref 79–97)
PLATELET # BLD AUTO: 193 10*3/MM3 (ref 140–450)
RBC # BLD AUTO: 3.42 10*6/MM3 (ref 3.77–5.28)
WBC # BLD AUTO: 7.36 10*3/MM3 (ref 3.4–10.8)

## 2024-08-12 PROCEDURE — 0502F SUBSEQUENT PRENATAL CARE: CPT | Performed by: STUDENT IN AN ORGANIZED HEALTH CARE EDUCATION/TRAINING PROGRAM

## 2024-08-12 NOTE — PROGRESS NOTES
Prenatal Care Visit    Subjective   Chief Complaint   Patient presents with    Routine Prenatal Visit     Glucola done today. No concerns.      History:   Bel is a  currently at 25w6d who presents for a prenatal care visit today.    Doing well. Denies CTX, VB, LOF. Reports (+) FM.     Objective   /70   Wt 84.6 kg (186 lb 9.6 oz)   LMP 2024   BMI 26.77 kg/m²   Physical Exam:  Normal, gestational age-appropriate exam today      Assessment & Plan     IUP @ 25w6d  Routine care: I have reviewed the prenatal labs and ultrasound(s) today. I have reviewed the most recent prenatal progress note(s). CBC, GTT today.  Bilateral fetal club feet: s/p consultation with PDC, has repeat sono at 32 wks scheduled  H/o LEEP: prior to G1  AMA: cfDNA wnl  Asthma: albuterol PRN     Diagnosis Plan   1. Prenatal care in second trimester  CBC (No Diff)    Gestational Screen 1 Hr ([facility])      2. Suspected fetal anomaly, antepartum, single or unspecified fetus        3. History of loop electrosurgical excision procedure (LEEP) of cervix affecting pregnancy, antepartum        4. Antepartum multigravida of advanced maternal age        5. Asthma during pregnancy           Medication Management: continue PNV    Topics discussed: Prenatal care milestones  Kick counts and fetal movement   labor signs and symptoms   Tests next visit: none   Next visit: 4 week(s)     Roselia Torres MD  Obstetrics and Gynecology  [facility]

## 2024-09-03 ENCOUNTER — ROUTINE PRENATAL (OUTPATIENT)
Dept: OBSTETRICS AND GYNECOLOGY | Facility: CLINIC | Age: 39
End: 2024-09-03
Payer: COMMERCIAL

## 2024-09-03 VITALS — DIASTOLIC BLOOD PRESSURE: 68 MMHG | WEIGHT: 189 LBS | SYSTOLIC BLOOD PRESSURE: 122 MMHG | BODY MASS INDEX: 27.12 KG/M2

## 2024-09-03 DIAGNOSIS — M21.542 ACQUIRED BILATERAL CLUB FEET: ICD-10-CM

## 2024-09-03 DIAGNOSIS — O09.529 ANTEPARTUM MULTIGRAVIDA OF ADVANCED MATERNAL AGE: ICD-10-CM

## 2024-09-03 DIAGNOSIS — O09.93 ENCOUNTER FOR SUPERVISION OF HIGH RISK PREGNANCY IN THIRD TRIMESTER, ANTEPARTUM: Primary | ICD-10-CM

## 2024-09-03 DIAGNOSIS — Z98.890 HISTORY OF LOOP ELECTROSURGICAL EXCISION PROCEDURE (LEEP) OF CERVIX AFFECTING PREGNANCY IN SECOND TRIMESTER: ICD-10-CM

## 2024-09-03 DIAGNOSIS — O34.42 HISTORY OF LOOP ELECTROSURGICAL EXCISION PROCEDURE (LEEP) OF CERVIX AFFECTING PREGNANCY IN SECOND TRIMESTER: ICD-10-CM

## 2024-09-03 DIAGNOSIS — M21.541 ACQUIRED BILATERAL CLUB FEET: ICD-10-CM

## 2024-09-03 PROCEDURE — 0502F SUBSEQUENT PRENATAL CARE: CPT | Performed by: OBSTETRICS & GYNECOLOGY

## 2024-09-03 NOTE — PROGRESS NOTES
Chief Complaint  Routine Prenatal Visit (No complaints. )    History of Present Illness:  Bel is a  currently at 29w0d who presents today with no significant complaints.  Patient does report good fetal movement.  She denies any significant cramping or contractions.  She is taking prenatal vitamins.  She does have a follow-up with [facility] for growth scan as well as clubfeet bilaterally.    Exam:  Vitals:  See prenatal flowsheet as noted and reviewed  General: Alert, cooperative, and does not appear in any distress  Abdomen:   See prenatal flowsheet as noted and reviewed    Uterus gravid, non-tender; no palpable masses    No guarding or rebound tenderness  Pelvic:  See prenatal flowsheet as noted and reviewed  Ext:  See prenatal flowsheet as noted and reviewed    Moves extremities well, no cyanosis and no redness  Urine:  See prenatal flowsheet as noted and reviewed    Data Review:  The following data was reviewed by: Lupe Ding MD on 2024:  Prenatal Labs:  Lab Results   Component Value Date    HGB 11.1 (L) 2024    RUBELLAABIGG 1.73 2024    HEPBSAG Negative 2024    ABO O 2024    RH Positive 2024    ABSCRN Negative 2024    [id number] Non Reactive 2024    HEPCVIRUSABY Non Reactive 2024    STREPGPB Negative 2021       Routine Prenatal on 2024   Component Date Value    WBC 2024 7.36     RBC 2024 3.42 (L)     Hemoglobin 2024 11.1 (L)     Hematocrit 2024 32.8 (L)     MCV 2024 95.9     MCH 2024 32.5     MCHC 2024 33.8     RDW 2024 12.5     Platelets 2024 193     Gestational Diabetes Scr* 2024 137      Imaging:  [facility]  PAT NAME: BEL HAYDEN  MED REC#: [id number]  BIRTH DA: 1985  PAT GEND: F  ACCOUNT#: [id number]  PAT TYPE: O  EXAM YARELY: [id number]  REF PHYS LIZ NICKERSON  ACCESSION [id number]    Comparison Studies  =================    There are no  relevant prior studies to which this study is being compared    Patient Status  ============    Outpatient    Indication  ========    AMA-Multipara, Bilateral clubbed feet    Maternal Assessment  ==================    Height 177 cm  Height (ft) 5 ft  Height (in) 10 in  Weight 80 kg  Weight (lb) 176 lb  BMI 25.54 kg/m²    Method  =======    Transabdominal ultrasound examination. View: Adequate view    Pregnancy  =========    Duncan pregnancy. Number of fetuses: 1    Dating  ======    Method of dating: based on stated LANCE  GA by prior assessment 23 w + 0 d  LANCE by prior assessment: 11/19/2024  Ultrasound examination on: 7/23/2024  GA by U/S based upon: AC, BPD, Femur, HC  GA by U/S 23 w + 0 d  LANCE by U/S: 11/19/2024  Assigned: based on stated LANCE, selected on 07/23/2024  Assigned GA 23 w + 0 d  Assigned LANCE: 11/19/2024  Pregnancy length 280 d    Fetal Biometry  ============    Standard  BPD 53.9 mm  22w 3d        23%        Hadlock    OFD 75.1 mm  24w 5d        92%        Maria R    .0 mm  23w 1d        42%        Hadlock    Cerebellum tr 25.5 mm  23w 0d        84%        Hill    .2 mm  23w 4d        61%        Hadlock    Femur 39.3 mm  22w 5d        27%        Hadlock    Humerus 36.9 mm  22w 6d        36%        Maria R    HC / AC 1.12     g          45%        Aris    EFW (lb) 1 lb  EFW (oz) 4 oz  EFW by: Hadlock (BPD-HC-AC-FL)  Extended  Tibia 36.4 mm  23w 5d        72%        Maria R    Fibula 35.0 mm  23w 0d        50%        Maria R    Foot 34.5 mm          <1%        Chitty    Radius 31.5 mm  22w 4d        44%        Maria R    Ulna 34.5 mm  23w 3d        42%        Maria R    Cav. septi pel. tr 4.5 mm   5.3 mm  CM 5.6 mm          46%        Nicolaides    Nasal bone 7.2 mm    Head / Face / Neck  Cephalic index 0.72          2%        Nicolaides    Extremities / Bony Struc  FL / BPD 0.73    FL / HC 0.19    FL / AC 0.21    Other Structures   bpm    General  Evaluation  ================    Cardiac activity present.  bpm.  Fetal movements present.  Presentation footling breech.  Placenta anterior, low lying.  Umbilical cord Cord vessels: 3 vessel cord. Insertion site: placental insertion: normal.  Amniotic fluid Amount of AF: normal. MVP 6.7 cm. HEVER 20.4 cm. Q1 4.3 cm, Q2 6.7 cm, Q3 5.0 cm, Q4 4.4 cm.    Fetal Anatomy  ============    Cranium: Appears normal  Midline falx: Appears normal  Cavum septi pellucidi: Appears normal  Cerebellum: Appears normal  Cisterna magna: Appears normal  Head / Neck  Rt lateral ventricle: Appears normal  Lt lateral ventricle: Appears normal  Rt choroid plexus: Appears normal  Lt choroid plexus: Appears normal  Vermis: Appears normal  Neck: Appears normal  Nuchal fold: Appears normal  Lips: Appear normal  Profile: Appears normal  Nose: Appears normal  Face  Nose: Nasal bone present  Palate: Appears normal  Orbits: Appears normal  Lens: Normal  4-chamber view: Appears normal  RVOT view: Appears normal  LVOT view: Appears normal  Heart / Thorax  Aortic arch view: Appears normal  Ductal arch view: Appears normal  SVC: normal  IVC: normal  3-vessel view: Appears normal  3-vessel-trachea view: Appears normal  Rt lung: Appears normal  Lt lung: normal  Diaphragm: Appears normal  Diaphragm: Intact  Cord insertion: Appears normal  Stomach: Appears normal  Bladder: Appears normal  Abdomen  Rt kidney: normal  Lt kidney: normal  Liver: normal  Small bowel: normal  Large bowel: normal  Cervical spine: Appears normal  Thoracic spine: Appears normal  Lumbar spine: Appears normal  Sacral spine: Appears normal  Arms: Appears normal  Legs: Appears normal  Rt upper arm: Appears normal  Rt forearm: Appears normal  Rt hand: Appears normal  Lt upper arm: Appears normal  Lt forearm: Appears normal  Lt hand: Appears normal  Rt upper leg: Appears normal  Rt lower leg: Appears normal  Rt foot: Appears normal  Lt upper leg: Appears normal  Lt lower  leg: Appears normal  Lt foot: Appears normal  Gender: male  Wants to know gender: yes    Maternal Structures  ================    Uterus / Cervix  Cervix: Visualized  Approach: Transabdominal  Cervical length 38.0 mm  Ovaries / Tubes / Adnexa  Rt ovary: Visualized  Rt ovary D1 14.9 mm  Rt ovary D2 18.7 mm  Rt ovary D3 27.4 mm  Rt ovary Vol 4.0 cm³  Lt ovary: Visualized  Lt ovary D1 15.9 mm  Lt ovary D2 19.4 mm  Lt ovary D3 27.00 mm  Lt ovary Vol 4.4 cm³    Consultation / Office Visit  ====================    Office note to follow    Impression  =========    Size consistent with dates.    Bilateral clubbed feet seen.  Fetal CNS and spine appear normal.  No other fetal anomalies were identified.    No fetal markers for trisomy.    The cervical length appears normal.    Recommendation  ===============    We recommend repeat evaluation for fetal growth and anatomy survey at 32 weeks gestation.  Follow up appointment scheduled here at 32 weeks gestation.    Coding  ======    Description: [id number] Detailed Ultrasound    Sonographer: Maddy Batista RDMS  Physician: Doug Milligan, MD, FACOG    Electronically signed by: Doug Milligan, MD, FACOG at:  14:15     Medical Records:  None    Assessment and Plan:  Problem List Items Addressed This Visit          Gravid and     AMA (advanced maternal age) multigravida 35+  Follow-up scan at [facility] as discussed.    Hx LEEP (loop electrosurgical excision procedure), cervix, pregnancy       Musculoskeletal and Injuries    fetal bilateral club feet     Other Visit Diagnoses       Encounter for supervision of high risk pregnancy in third trimester, antepartum    -  Primary  Topics discussed:     GERD management  iron supplementation  kick counts and fetal movement  PIH precautions   labor signs and symptoms  Follow-up scan as noted.          Follow Up/Instructions:  Follow up as scheduled.  Patient was given instructions and counseling regarding her condition or for  health maintenance advice. Please see specific information pulled into the AVS if appropriate.     Note: Speech recognition transcription software may have been used to dictate portions of this document.  An attempt at proofreading has been made though minor errors in transcription may still be present.    This note was electronically signed.  Lupe Ding M.D.

## 2024-09-16 ENCOUNTER — ROUTINE PRENATAL (OUTPATIENT)
Dept: OBSTETRICS AND GYNECOLOGY | Facility: CLINIC | Age: 39
End: 2024-09-16
Payer: COMMERCIAL

## 2024-09-16 VITALS — DIASTOLIC BLOOD PRESSURE: 64 MMHG | BODY MASS INDEX: 27.43 KG/M2 | WEIGHT: 191.2 LBS | SYSTOLIC BLOOD PRESSURE: 100 MMHG

## 2024-09-16 DIAGNOSIS — M21.542 ACQUIRED BILATERAL CLUB FEET: Primary | ICD-10-CM

## 2024-09-16 DIAGNOSIS — M21.541 ACQUIRED BILATERAL CLUB FEET: Primary | ICD-10-CM

## 2024-09-16 PROCEDURE — 0502F SUBSEQUENT PRENATAL CARE: CPT | Performed by: OBSTETRICS & GYNECOLOGY

## 2024-09-25 ENCOUNTER — HOSPITAL ENCOUNTER (OUTPATIENT)
Dept: WOMENS IMAGING | Facility: HOSPITAL | Age: 39
Discharge: HOME | End: 2024-09-25
Admitting: OBSTETRICS & GYNECOLOGY
Payer: COMMERCIAL

## 2024-09-25 ENCOUNTER — OFFICE VISIT (OUTPATIENT)
Dept: OBSTETRICS AND GYNECOLOGY | Facility: HOSPITAL | Age: 39
End: 2024-09-25
Payer: COMMERCIAL

## 2024-09-25 VITALS — DIASTOLIC BLOOD PRESSURE: 78 MMHG | SYSTOLIC BLOOD PRESSURE: 122 MMHG | WEIGHT: 194 LBS | BODY MASS INDEX: 27.84 KG/M2

## 2024-09-25 DIAGNOSIS — M21.542 ACQUIRED BILATERAL CLUB FEET: ICD-10-CM

## 2024-09-25 DIAGNOSIS — O09.529 ANTEPARTUM MULTIGRAVIDA OF ADVANCED MATERNAL AGE: ICD-10-CM

## 2024-09-25 DIAGNOSIS — O44.40 LOW LYING PLACENTA, ANTEPARTUM: ICD-10-CM

## 2024-09-25 DIAGNOSIS — M21.541 ACQUIRED BILATERAL CLUB FEET: ICD-10-CM

## 2024-09-25 DIAGNOSIS — Z3A.32 32 WEEKS GESTATION OF PREGNANCY: ICD-10-CM

## 2024-09-25 DIAGNOSIS — O09.529 ANTEPARTUM MULTIGRAVIDA OF ADVANCED MATERNAL AGE: Primary | ICD-10-CM

## 2024-09-25 DIAGNOSIS — Z34.90 PREGNANCY, UNSPECIFIED GESTATIONAL AGE: ICD-10-CM

## 2024-09-25 PROCEDURE — 76819 FETAL BIOPHYS PROFIL W/O NST: CPT

## 2024-09-25 PROCEDURE — 76816 OB US FOLLOW-UP PER FETUS: CPT

## 2024-09-25 NOTE — PROGRESS NOTES
Patient denies leaking fluid or bleeding, states having donaldo segundo.  NIPT negative.  Patient states follow up with Dr. Oconnor's office is 10/2/24.

## 2024-10-02 ENCOUNTER — ROUTINE PRENATAL (OUTPATIENT)
Dept: OBSTETRICS AND GYNECOLOGY | Facility: CLINIC | Age: 39
End: 2024-10-02
Payer: COMMERCIAL

## 2024-10-02 VITALS — WEIGHT: 193 LBS | SYSTOLIC BLOOD PRESSURE: 118 MMHG | DIASTOLIC BLOOD PRESSURE: 74 MMHG | BODY MASS INDEX: 27.69 KG/M2

## 2024-10-02 DIAGNOSIS — M21.542 ACQUIRED BILATERAL CLUB FEET: ICD-10-CM

## 2024-10-02 DIAGNOSIS — O09.529 ANTEPARTUM MULTIGRAVIDA OF ADVANCED MATERNAL AGE: ICD-10-CM

## 2024-10-02 DIAGNOSIS — J45.20 MILD INTERMITTENT ASTHMA WITHOUT COMPLICATION: ICD-10-CM

## 2024-10-02 DIAGNOSIS — Z34.93 PRENATAL CARE IN THIRD TRIMESTER: Primary | ICD-10-CM

## 2024-10-02 DIAGNOSIS — M21.541 ACQUIRED BILATERAL CLUB FEET: ICD-10-CM

## 2024-10-09 ENCOUNTER — ROUTINE PRENATAL (OUTPATIENT)
Dept: OBSTETRICS AND GYNECOLOGY | Facility: CLINIC | Age: 39
End: 2024-10-09
Payer: COMMERCIAL

## 2024-10-09 VITALS — WEIGHT: 195.2 LBS | DIASTOLIC BLOOD PRESSURE: 70 MMHG | BODY MASS INDEX: 28.01 KG/M2 | SYSTOLIC BLOOD PRESSURE: 110 MMHG

## 2024-10-09 DIAGNOSIS — Z34.93 THIRD TRIMESTER PREGNANCY: Primary | ICD-10-CM

## 2024-10-09 LAB
BASOPHILS # BLD AUTO: 0.02 10*3/MM3 (ref 0–0.2)
BASOPHILS NFR BLD AUTO: 0.2 % (ref 0–1.5)
EOSINOPHIL # BLD AUTO: 0.2 10*3/MM3 (ref 0–0.4)
EOSINOPHIL NFR BLD AUTO: 2.3 % (ref 0.3–6.2)
ERYTHROCYTE [DISTWIDTH] IN BLOOD BY AUTOMATED COUNT: 12.2 % (ref 12.3–15.4)
HCT VFR BLD AUTO: 34.4 % (ref 34–46.6)
HGB BLD-MCNC: 11.5 G/DL (ref 12–15.9)
IMM GRANULOCYTES # BLD AUTO: 0.04 10*3/MM3 (ref 0–0.05)
IMM GRANULOCYTES NFR BLD AUTO: 0.5 % (ref 0–0.5)
LYMPHOCYTES # BLD AUTO: 1.57 10*3/MM3 (ref 0.7–3.1)
LYMPHOCYTES NFR BLD AUTO: 17.7 % (ref 19.6–45.3)
MCH RBC QN AUTO: 31.9 PG (ref 26.6–33)
MCHC RBC AUTO-ENTMCNC: 33.4 G/DL (ref 31.5–35.7)
MCV RBC AUTO: 95.6 FL (ref 79–97)
MONOCYTES # BLD AUTO: 0.76 10*3/MM3 (ref 0.1–0.9)
MONOCYTES NFR BLD AUTO: 8.6 % (ref 5–12)
NEUTROPHILS # BLD AUTO: 6.28 10*3/MM3 (ref 1.7–7)
NEUTROPHILS NFR BLD AUTO: 70.7 % (ref 42.7–76)
NRBC BLD AUTO-RTO: 0 /100 WBC (ref 0–0.2)
PLATELET # BLD AUTO: 176 10*3/MM3 (ref 140–450)
RBC # BLD AUTO: 3.6 10*6/MM3 (ref 3.77–5.28)
WBC # BLD AUTO: 8.87 10*3/MM3 (ref 3.4–10.8)

## 2024-10-09 PROCEDURE — 0502F SUBSEQUENT PRENATAL CARE: CPT | Performed by: OBSTETRICS & GYNECOLOGY

## 2024-10-09 NOTE — PROGRESS NOTES
Chief Complaint   Patient presents with    Routine Prenatal Visit     Prenatal visit with no problems or concerns        HPI:   , 34w1d gestation reports doing well    ROS:  See Prenatal Episode/Flowsheet  /70   Wt 88.5 kg (195 lb 3.2 oz)   LMP 2024   BMI 28.01 kg/m²      EXAM:  EXTREMITIES:  No swelling-See Prenatal Episode/Flowsheet    ABDOMEN:  FHTs/Movement noted-See Prenatal Episode/Flowsheet    URINE GLUCOSE/PROTEIN:  See Prenatal Episode/Flowsheet    PELVIC EXAM:  See Prenatal Episode/Flowsheet  CV:  Lungs:  GYN:    MDM:    Lab Results   Component Value Date    HGB 11.1 (L) 2024    RUBELLAABIGG 1.73 2024    HEPBSAG Negative 2024    ABO O 2024    RH Positive 2024    ABSCRN Negative 2024    [id number] Non Reactive 2024    HEPCVIRUSABY Non Reactive 2024    STREPGPB Negative 2021       U/S:[facility] (2024 13:31)     1. IUP 34w1d  2. Routine care   3. Bilateral clubbing- PDC   4. Placenta has moved  5. CBC today

## 2024-10-10 NOTE — PROGRESS NOTES
Prenatal Care Visit    Subjective   Chief Complaint   Patient presents with    Routine Prenatal Visit     No Complaints/concerns       History:   Bel is a  currently at 33w1d who presents for a prenatal care visit today.    No issues.    Social History    Tobacco Use      Smoking status: Former        Packs/day: 0.00        Years: 0.1 packs/day for 10.0 years (0.5 ttl pk-yrs)        Types: Cigarettes        Start date: 2010        Quit date: 2020        Years since quittin.4      Smokeless tobacco: Never       Objective   /74   Wt 87.5 kg (193 lb)   LMP 2024   BMI 27.69 kg/m²   Physical Exam:  Normal, gestational age-appropriate exam today        Plan   Medical Decision Making:    I have reviewed the prenatal labs and ultrasound(s) today. I have reviewed the most recent prenatal progress note(s).    Diagnosis: Supervision of high risk pregnancy  AMA, NIPS NEG  Asthma  Bilateral fetal club feet  H/o LEEP   Tests/Orders/Rx today: No orders of the defined types were placed in this encounter.      Medication Management: None     Topics discussed: Prenatal care milestones  kick counts and fetal movement  PIH precautions   labor signs and symptoms  U/S findings  MFM following   Tests next visit: none   Next visit: 1 week(s)     Louie Oconnor MD  Obstetrics and Gynecology  [facility]

## 2024-10-20 ENCOUNTER — HOSPITAL ENCOUNTER (OUTPATIENT)
Facility: HOSPITAL | Age: 39
Discharge: HOME | End: 2024-10-20
Attending: STUDENT IN AN ORGANIZED HEALTH CARE EDUCATION/TRAINING PROGRAM | Admitting: STUDENT IN AN ORGANIZED HEALTH CARE EDUCATION/TRAINING PROGRAM
Payer: COMMERCIAL

## 2024-10-20 VITALS
BODY MASS INDEX: 27.02 KG/M2 | HEIGHT: 71 IN | RESPIRATION RATE: 18 BRPM | WEIGHT: 193 LBS | HEART RATE: 79 BPM | SYSTOLIC BLOOD PRESSURE: 99 MMHG | TEMPERATURE: 98.4 F | OXYGEN SATURATION: 100 % | DIASTOLIC BLOOD PRESSURE: 54 MMHG

## 2024-10-20 LAB
BILIRUB BLD-MCNC: NEGATIVE MG/DL
CLARITY, POC: CLEAR
COLOR UR: YELLOW
GLUCOSE UR STRIP-MCNC: NEGATIVE MG/DL
KETONES UR QL: NEGATIVE
LEUKOCYTE EST, POC: NEGATIVE
NITRITE UR-MCNC: NEGATIVE MG/ML
PH UR: 6 [PH] (ref 5–8)
PROT UR STRIP-MCNC: ABNORMAL MG/DL
RBC # UR STRIP: NEGATIVE /UL
SP GR UR: 1.01 (ref 1–1.03)
UROBILINOGEN UR QL: NORMAL

## 2024-10-20 PROCEDURE — 81002 URINALYSIS NONAUTO W/O SCOPE: CPT | Performed by: STUDENT IN AN ORGANIZED HEALTH CARE EDUCATION/TRAINING PROGRAM

## 2024-10-20 PROCEDURE — 59025 FETAL NON-STRESS TEST: CPT | Performed by: STUDENT IN AN ORGANIZED HEALTH CARE EDUCATION/TRAINING PROGRAM

## 2024-10-20 PROCEDURE — G0463 HOSPITAL OUTPT CLINIC VISIT: HCPCS

## 2024-10-20 PROCEDURE — 59025 FETAL NON-STRESS TEST: CPT

## 2024-10-20 RX ORDER — SODIUM CHLORIDE 9 MG/ML
40 INJECTION, SOLUTION INTRAVENOUS ONCE
Status: DISCONTINUED | OUTPATIENT
Start: 2024-10-20 | End: 2024-10-20 | Stop reason: HOSPADM

## 2024-10-20 RX ORDER — LIDOCAINE HYDROCHLORIDE 10 MG/ML
0.5 INJECTION, SOLUTION INFILTRATION; PERINEURAL ONCE AS NEEDED
Status: DISCONTINUED | OUTPATIENT
Start: 2024-10-20 | End: 2024-10-20 | Stop reason: HOSPADM

## 2024-10-20 RX ORDER — ACETAMINOPHEN 500 MG
1000 TABLET ORAL ONCE
Status: COMPLETED | OUTPATIENT
Start: 2024-10-20 | End: 2024-10-20

## 2024-10-20 RX ORDER — SODIUM CHLORIDE 0.9 % (FLUSH) 0.9 %
10 SYRINGE (ML) INJECTION EVERY 12 HOURS SCHEDULED
Status: DISCONTINUED | OUTPATIENT
Start: 2024-10-20 | End: 2024-10-20 | Stop reason: HOSPADM

## 2024-10-20 RX ORDER — SODIUM CHLORIDE 0.9 % (FLUSH) 0.9 %
10 SYRINGE (ML) INJECTION AS NEEDED
Status: DISCONTINUED | OUTPATIENT
Start: 2024-10-20 | End: 2024-10-20 | Stop reason: HOSPADM

## 2024-10-20 RX ADMIN — ACETAMINOPHEN 1000 MG: 500 TABLET, FILM COATED ORAL at 14:44

## 2024-10-20 NOTE — NON STRESS TEST
Triage Note - Nursing Documentation  Labor and Delivery Admission Log    Bel Brown  : 1985  MRN: [id number]  CSN: [id number]    Date in / Time in:  10/20/2024  Time in: 1406    Date out / Time out:    Time out: 1520    Nurse: Maureen Donato RN    Patient Info: She is a 39 y.o. year old  at 35w5d with an LANCE of 2024, by Ultrasound who was seen on the [facility].    Chief Complaint:   Chief Complaint   Patient presents with    Abdominal Cramping     Vaginal bleeding yesterday, brown mucus discharge. Mild menstrual type cramping since rating a 4          Provider Instructions / Disposition: Patient PO hydrated, 1000mg tylenol given, pain eased. VE 0,0,-3. Discharged home, instructed on fetal kick counts, s/s of labor, reasons to return to labor moura, pelvic rest, and to follow up with Ob provider tomorrow. Patient and significant other verbalized understanding.     Patient Active Problem List   Diagnosis    Mild intermittent asthma without complication    Panic disorder    AMA (advanced maternal age) multigravida 35+    Hx LEEP (loop electrosurgical excision procedure), cervix, pregnancy    fetal bilateral club feet    Low lying placenta, antepartum       NST Documentation (Only applicable > 32 weeks): Interpretation A  Nonstress Test Interpretation A: Reactive (10/20/24 1517 : Maureen Donato, RN)

## 2024-10-21 ENCOUNTER — ROUTINE PRENATAL (OUTPATIENT)
Dept: OBSTETRICS AND GYNECOLOGY | Facility: CLINIC | Age: 39
End: 2024-10-21
Payer: COMMERCIAL

## 2024-10-21 VITALS — WEIGHT: 196 LBS | SYSTOLIC BLOOD PRESSURE: 118 MMHG | BODY MASS INDEX: 27.34 KG/M2 | DIASTOLIC BLOOD PRESSURE: 70 MMHG

## 2024-10-21 DIAGNOSIS — M21.542 ACQUIRED BILATERAL CLUB FEET: ICD-10-CM

## 2024-10-21 DIAGNOSIS — M21.541 ACQUIRED BILATERAL CLUB FEET: ICD-10-CM

## 2024-10-21 DIAGNOSIS — O46.93 VAGINAL BLEEDING IN PREGNANCY, THIRD TRIMESTER: ICD-10-CM

## 2024-10-21 DIAGNOSIS — O09.529 ANTEPARTUM MULTIGRAVIDA OF ADVANCED MATERNAL AGE: ICD-10-CM

## 2024-10-21 DIAGNOSIS — O09.93 ENCOUNTER FOR SUPERVISION OF HIGH RISK PREGNANCY IN THIRD TRIMESTER, ANTEPARTUM: Primary | ICD-10-CM

## 2024-10-21 PROCEDURE — 0502F SUBSEQUENT PRENATAL CARE: CPT | Performed by: OBSTETRICS & GYNECOLOGY

## 2024-10-21 NOTE — PROGRESS NOTES
Chief Complaint  Routine Prenatal Visit (Patient wants to discuss maternity leave.)    History of Present Illness:  Bel is a  currently at 35w6d who presents today with here with complaints of vaginal bleeding.  Patient had the onset of bleeding on Saturday.  Patient remained off her feet and continued to have symptoms the following morning.  She was seen on labor and delivery.  She was monitored for approximately 2 hours.  She reports her cervix was closed.  She has had dark discharge since that time but not heavy in nature.  She has remained off work.  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  She has continued to have occasional cramps and tightening.  She does have an appointment for follow-up scan at [facility] on Thursday.    Exam:  Vitals:  See prenatal flowsheet as noted and reviewed  General: Alert, cooperative, and does not appear in any distress  Abdomen:   See prenatal flowsheet as noted and reviewed    Uterus gravid, non-tender; no palpable masses    No guarding or rebound tenderness  Pelvic:  See prenatal flowsheet as noted and reviewed  Ext:  See prenatal flowsheet as noted and reviewed    Moves extremities well, no cyanosis and no redness  Urine:  See prenatal flowsheet as noted and reviewed    Data Review:  The following data was reviewed by: Lupe Ding MD on 10/21/2024:  Prenatal Labs:  Lab Results   Component Value Date    HGB 11.5 (L) 10/09/2024    RUBELLAABIGG 1.73 2024    HEPBSAG Negative 2024    ABO O 2024    RH Positive 2024    ABSCRN Negative 2024    FOV0LOO5 Non Reactive 2024    HEPCVIRUSABY Non Reactive 2024    STREPGPB Negative 2021       Admission on 10/20/2024, Discharged on 10/20/2024   Component Date Value    Color 10/20/2024 Yellow     Clarity, UA 10/20/2024 Clear     Glucose, UA 10/20/2024 Negative     Bilirubin 10/20/2024 Negative     Ketones, UA 10/20/2024 Negative     Specific Gravity  10/20/2024  1.015     Blood, UA 10/20/2024 Negative     pH, Urine 10/20/2024 6.0     Protein, POC 10/20/2024 Trace (A)     Urobilinogen, UA 10/20/2024 Normal     Leukocytes 10/20/2024 Negative     Nitrite, UA 10/20/2024 Negative    Routine Prenatal on 10/09/2024   Component Date Value    WBC 10/09/2024 8.87     RBC 10/09/2024 3.60 (L)     Hemoglobin 10/09/2024 11.5 (L)     Hematocrit 10/09/2024 34.4     MCV 10/09/2024 95.6     MCH 10/09/2024 31.9     MCHC 10/09/2024 33.4     RDW 10/09/2024 12.2 (L)     Platelets 10/09/2024 176     Neutrophil Rel % 10/09/2024 70.7     Lymphocyte Rel % 10/09/2024 17.7 (L)     Monocyte Rel % 10/09/2024 8.6     Eosinophil Rel % 10/09/2024 2.3     Basophil Rel % 10/09/2024 0.2     Neutrophils Absolute 10/09/2024 6.28     Lymphocytes Absolute 10/09/2024 1.57     Monocytes Absolute 10/09/2024 0.76     Eosinophils Absolute 10/09/2024 0.20     Basophils Absolute 10/09/2024 0.02     Immature Granulocyte Rel* 10/09/2024 0.5     Immature Grans Absolute 10/09/2024 0.04     nRBC 10/09/2024 0.0      Imaging:  [facility]  PAT NAME: ISABELLA HAYDEN  [facility] REC#: [id number]  BIRTH DA: 1985  PAT GEND: F  ACCOUNT#: [id number]  PAT TYPE: O  EXAM YARELY: [id number]  REF PHYS JASKARAN NICKERSONN  ACCESSION [id number]    Comparison Studies  =================    The findings of this study are compared to the prior ultrasound study dated 24    Patient Status  ============    Outpatient    Indication  ========    AMA-Multipara. Bilateral clubbed feet.    Maternal Assessment  ==================    Height 177 cm  Height (ft) 5 ft  Height (in) 10 in  Weight 88 kg  Weight (lb) 194 lb  BMI 28.09 kg/m²    Method  =======    Transabdominal ultrasound examination. View: Good view    Pregnancy  =========    Duncan pregnancy. Number of fetuses: 1    Dating  ======    Method of dating: based on stated LANCE  GA by prior assessment 32 w + 1 d  LANCE by prior assessment: 2024  Ultrasound examination  on: 2024  GA by U/S based upon: AC, BPD, Femur, HC  GA by U/S 32 w + 3 d  LANCE by U/S: 2024  Previous dating: based on stated LANCE, selected on 2024  Agreed LANCE of previous datin2024  Assigned: based on stated LANCE, selected on 2024  Assigned GA 32 w + 1 d  Assigned LANCE: 2024  Pregnancy length 280 d    Fetal Biometry  ============    Standard  BPD 78.1 mm  31w 2d        19%        Hadlock    .3 mm  35w 5d        98%        Maria R    .8 mm  33w 1d        37%        Hadlock    Cerebellum tr 41.6 mm  33w 0d        55%        Hill    .6 mm  33w 6d        90%        Hadlock    Femur 60.1 mm  31w 2d        16%        Hadlock    Humerus 52.5 mm  30w 4d        14%        Maria R    HC / AC 1.00    EFW 2,052 g          57%        Aris    EFW (lb) 4 lb  EFW (oz) 8 oz  EFW by: Hadlock (BPD-HC-AC-FL)  Extended  Cav. septi pel. tr 5.5 mm   4.8 mm  CM 5.2 mm          5%        Nicolaides    Head / Face / Neck  Cephalic index 0.72          1%        Nicolaides    Extremities / Bony Struc  FL / BPD 0.77    FL / HC 0.20    FL / AC 0.20    Other Structures   bpm    General Evaluation  ================    Cardiac activity present.  bpm.  Fetal movements present.  Presentation cephalic.  Placenta Placental site: anterior.  Umbilical cord Cord vessels: 3 vessel cord.  Amniotic fluid Amount of AF: normal. MVP 6.5 cm. HEVER 22.1 cm. Q1 6.5 cm, Q2 5.9 cm, Q3 5.3 cm, Q4 4.5 cm.    Fetal Anatomy  ============    Cranium: Normal  Cavum septi pellucidi: Normal  Cerebellum: Normal  Cisterna magna: Normal  Head / Neck  Rt lateral ventricle: Normal  Lt lateral ventricle: Normal  Lips: Normal  4-chamber view: Appears normal  RVOT view: Appears normal  LVOT view: Appears normal  Heart / Thorax  3-vessel view: Appears normal  3-vessel-trachea view: Appears normal  Cord insertion: Normal  Stomach: Appears normal  Kidneys: Appears normal  Bladder: Appears normal  Rt  foot: ABNORMAL  Rt foot: clubbing  Lt foot: ABNORMAL  Lt foot: clubbing  Gender: male  Wants to know gender: yes    Fetal Doppler  ===========    Arterial  Umbilical A PI 1.20          92%        Jeramie    Umbilical A RI 0.65          69%        Jeramie    Umbilical A PS 40.33 cm/s          16%        Ebbing    Umbilical A ED 14.06 cm/s  Umbilical A TAmax 21.81 cm/s          3%        Ebbing    Umbilical A MD 11.50 cm/s  Umbilical A S / D 2.87          61%        Jeramie    Umbilical A  bpm    Biophysical Profile  ===============    2: Fetal breathing movements  2: Gross body movements  2: Fetal tone  2: Amniotic fluid volume  8/8 Biophysical profile score  Interpretation: normal    Impression  =========    Bel presents for a follow-up ultrasound to assess interval growth and fetal wellbeing.    On today's exam, SIUP is noted in cephalic presentation with biometry demonstrating slightly accelerated growth. EFW overall measures at the 57th percentile. AC  measures at the 90th percentile. There is a normal amount of amniotic fluid. Placenta is anterior and well away from the cervix. BPP is 8/8. UA Dopplers are normal.    Bilateral club feet are again seen. All other visualized anatomy appears normal.    Recommendation  ===============    Follow-up scheduled in 4wks.  Referral to [facility] sent.    Coding  ======    Description: 82184-84 Follow Up Ultrasound  Description: 43750-32 BPP without NST    Sonographer: RT Maik R , [facility]  Physician: Esperanza Campos MD    Electronically signed by: Esperanza Campos MD at:  12:39     Medical Records:  None    Assessment and Plan:  Problem List Items Addressed This Visit          Gravid and     AMA (advanced maternal age) multigravida 35+       Musculoskeletal and Injuries    fetal bilateral club feet  Patient per appointment at [facility] as noted     Other Visit Diagnoses       Encounter for supervision of high risk pregnancy in third  trimester, antepartum    -  Primary  Topics discussed:     kick counts and fetal movement  PIH precautions   labor signs and symptoms -is given off work for remainder of the pregnancy.  Patient informed and modified bedrest as well as pelvic rest.  Patient to follow-up as discussed.  GBS next visit    Vaginal bleeding in pregnancy, third trimester      Note is given as noted.  Follow-up as discussed.          Follow Up/Instructions:  Follow up as scheduled.  Patient was given instructions and counseling regarding her condition or for health maintenance advice. Please see specific information pulled into the AVS if appropriate.     Note: Speech recognition transcription software may have been used to dictate portions of this document.  An attempt at proofreading has been made though minor errors in transcription may still be present.    This note was electronically signed.  Lupe Ding M.D.

## 2024-10-24 ENCOUNTER — OFFICE VISIT (OUTPATIENT)
Dept: OBSTETRICS AND GYNECOLOGY | Facility: HOSPITAL | Age: 39
End: 2024-10-24
Payer: COMMERCIAL

## 2024-10-24 ENCOUNTER — HOSPITAL ENCOUNTER (OUTPATIENT)
Dept: WOMENS IMAGING | Facility: HOSPITAL | Age: 39
Discharge: HOME | End: 2024-10-24
Admitting: OBSTETRICS & GYNECOLOGY
Payer: COMMERCIAL

## 2024-10-24 VITALS — WEIGHT: 198 LBS | BODY MASS INDEX: 27.62 KG/M2 | SYSTOLIC BLOOD PRESSURE: 126 MMHG | DIASTOLIC BLOOD PRESSURE: 75 MMHG

## 2024-10-24 DIAGNOSIS — O09.523 MULTIGRAVIDA OF ADVANCED MATERNAL AGE IN THIRD TRIMESTER: Primary | ICD-10-CM

## 2024-10-24 DIAGNOSIS — M21.541 ACQUIRED BILATERAL CLUB FEET: ICD-10-CM

## 2024-10-24 DIAGNOSIS — O09.529 ANTEPARTUM MULTIGRAVIDA OF ADVANCED MATERNAL AGE: ICD-10-CM

## 2024-10-24 DIAGNOSIS — M21.542 ACQUIRED BILATERAL CLUB FEET: ICD-10-CM

## 2024-10-24 PROCEDURE — 76816 OB US FOLLOW-UP PER FETUS: CPT

## 2024-10-24 PROCEDURE — 76819 FETAL BIOPHYS PROFIL W/O NST: CPT

## 2024-10-24 NOTE — PROGRESS NOTES
Patient denies any current leaking fluid, bleeding, or contractions. Patient states went to ER 10/20 for vaginal bleeding and abdominal pain. Was monitored for a few hours and put on modified bedrest.  NIPT negative  Patient states follow up with Dr. Oconnor's office is 10/25.

## 2024-10-25 ENCOUNTER — ROUTINE PRENATAL (OUTPATIENT)
Dept: OBSTETRICS AND GYNECOLOGY | Facility: CLINIC | Age: 39
End: 2024-10-25
Payer: COMMERCIAL

## 2024-10-25 VITALS — SYSTOLIC BLOOD PRESSURE: 120 MMHG | DIASTOLIC BLOOD PRESSURE: 70 MMHG | WEIGHT: 197.2 LBS | BODY MASS INDEX: 27.5 KG/M2

## 2024-10-25 DIAGNOSIS — M21.542 ACQUIRED BILATERAL CLUB FEET: ICD-10-CM

## 2024-10-25 DIAGNOSIS — O09.523 MULTIGRAVIDA OF ADVANCED MATERNAL AGE IN THIRD TRIMESTER: ICD-10-CM

## 2024-10-25 DIAGNOSIS — M21.541 ACQUIRED BILATERAL CLUB FEET: ICD-10-CM

## 2024-10-25 DIAGNOSIS — Z34.83 ENCOUNTER FOR SUPERVISION OF OTHER NORMAL PREGNANCY IN THIRD TRIMESTER: Primary | ICD-10-CM

## 2024-10-25 NOTE — PROGRESS NOTES
Chief Complaint   Patient presents with    Routine Prenatal Visit     GBS done today, doing well. Patient is on bed rest       HPI: Bel is a  currently at 36w3d here for prenatal visit who reports the following:  Baby is active. She has continued with bedrest, she is off work. She denies any further bleeding and number of ctxs have decreased.                EXAM:     Vitals:    10/25/24 1010   BP: 120/70      Abdomen:   See prenatal flowsheet as noted and reviewed, soft, nontender   Pelvic:  See prenatal flowsheet as noted and reviewed   Urine:  See prenatal flowsheet as noted and reviewed    Lab Results   Component Value Date    ABO O 2024    RH Positive 2024    ABSCRN Negative 2024       MDM:  Impression: AMA - cfDNA normal  Bilateral club feet   Tests done today: GBS testing   Topics discussed: kick counts and fetal movement   labor signs and symptoms  May slowly increase activity after 37 weeks  Reviewed OB labs   Tests next visit: none                RTO:                        1 weeks    This note was electronically signed.  Linda Wood, NIK  10/25/2024

## 2024-10-27 LAB — GP B STREP DNA SPEC QL NAA+PROBE: NEGATIVE

## 2024-10-28 ENCOUNTER — TELEPHONE (OUTPATIENT)
Dept: OBSTETRICS AND GYNECOLOGY | Facility: CLINIC | Age: 39
End: 2024-10-28

## 2024-10-28 NOTE — TELEPHONE ENCOUNTER
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

## 2024-10-31 ENCOUNTER — ROUTINE PRENATAL (OUTPATIENT)
Dept: OBSTETRICS AND GYNECOLOGY | Facility: CLINIC | Age: 39
End: 2024-10-31
Payer: COMMERCIAL

## 2024-10-31 VITALS — DIASTOLIC BLOOD PRESSURE: 72 MMHG | BODY MASS INDEX: 27.6 KG/M2 | SYSTOLIC BLOOD PRESSURE: 122 MMHG | WEIGHT: 197.9 LBS

## 2024-10-31 DIAGNOSIS — O09.523 MULTIGRAVIDA OF ADVANCED MATERNAL AGE IN THIRD TRIMESTER: Primary | ICD-10-CM

## 2024-10-31 NOTE — PROGRESS NOTES
Chief Complaint   Patient presents with    Routine Prenatal Visit     Doing well       HPI: Bel is a  currently at 37w2d here for prenatal visit who reports the following:  Baby is active. She has BH ctxs. Denies leakage of fluid or vaginal bleeding.                EXAM:     Vitals:    10/31/24 0959   BP: 122/72      Abdomen:   See prenatal flowsheet as noted and reviewed, soft, nontender   Pelvic:  See prenatal flowsheet as noted and reviewed   Urine:  See prenatal flowsheet as noted and reviewed    Lab Results   Component Value Date    ABO O 2024    RH Positive 2024    ABSCRN Negative 2024       MDM:  Impression: AMA - cfDNA normal   Tests done today: none   Topics discussed: kick counts and fetal movement  labor signs and symptoms  Reviewed OB labs   Tests next visit: none                RTO:                        1 weeks    This note was electronically signed.  Linda Wood, NIK  10/31/2024

## 2024-11-07 ENCOUNTER — ROUTINE PRENATAL (OUTPATIENT)
Dept: OBSTETRICS AND GYNECOLOGY | Facility: CLINIC | Age: 39
End: 2024-11-07
Payer: COMMERCIAL

## 2024-11-07 VITALS — BODY MASS INDEX: 27.48 KG/M2 | WEIGHT: 197 LBS | SYSTOLIC BLOOD PRESSURE: 114 MMHG | DIASTOLIC BLOOD PRESSURE: 70 MMHG

## 2024-11-07 DIAGNOSIS — O09.523 MULTIGRAVIDA OF ADVANCED MATERNAL AGE IN THIRD TRIMESTER: Primary | ICD-10-CM

## 2024-11-07 DIAGNOSIS — M21.541 ACQUIRED BILATERAL CLUB FEET: ICD-10-CM

## 2024-11-07 DIAGNOSIS — M21.542 ACQUIRED BILATERAL CLUB FEET: ICD-10-CM

## 2024-11-07 NOTE — PROGRESS NOTES
Chief Complaint   Patient presents with    Routine Prenatal Visit     No Complaints/concerns        HPI: Bel is a  currently at 38w2d here for prenatal visit who reports the following:  Baby is active. She hasn't had many ctxs. She is getting over a stomach virus.                EXAM:     Vitals:    24 1100   BP: 114/70      Abdomen:   See prenatal flowsheet as noted and reviewed, soft, nontender   Pelvic:  50/-3 posterior   Urine:  See prenatal flowsheet as noted and reviewed    Lab Results   Component Value Date    ABO O 2024    RH Positive 2024    ABSCRN Negative 2024       MDM:  Impression: Fetal bilateral club feet   AMA - cfDNA normal   Tests done today: none   Topics discussed: kick counts and fetal movement  labor signs and symptoms  Reviewed OB labs   Tests next visit: none                RTO:                        1 weeks    This note was electronically signed.  Linda Wood, APRN  2024

## 2024-11-14 ENCOUNTER — ROUTINE PRENATAL (OUTPATIENT)
Dept: OBSTETRICS AND GYNECOLOGY | Facility: CLINIC | Age: 39
End: 2024-11-14
Payer: COMMERCIAL

## 2024-11-14 VITALS — BODY MASS INDEX: 27.89 KG/M2 | SYSTOLIC BLOOD PRESSURE: 124 MMHG | WEIGHT: 200 LBS | DIASTOLIC BLOOD PRESSURE: 66 MMHG

## 2024-11-14 DIAGNOSIS — M21.542 ACQUIRED BILATERAL CLUB FEET: ICD-10-CM

## 2024-11-14 DIAGNOSIS — M21.541 ACQUIRED BILATERAL CLUB FEET: ICD-10-CM

## 2024-11-14 DIAGNOSIS — O09.523 MULTIGRAVIDA OF ADVANCED MATERNAL AGE IN THIRD TRIMESTER: Primary | ICD-10-CM

## 2024-11-14 NOTE — PROGRESS NOTES
Chief Complaint   Patient presents with    Routine Prenatal Visit     Patient complains of contractions.        HPI: Bel is a  currently at 39w2d here for prenatal visit who reports the following:  Baby is active. She had some ctxs last night. She denies any leakage of fluid or vaginal bleeding                EXAM:     Vitals:    24 1005   BP: 124/66      Abdomen:   See prenatal flowsheet as noted and reviewed, soft, nontender   Pelvic:  350/-3 posterior   Urine:  See prenatal flowsheet as noted and reviewed    Lab Results   Component Value Date    ABO O 2024    RH Positive 2024    ABSCRN Negative 2024       MDM:  Impression: Bilateral club feet  AMA - cfDNA normal   Tests done today: none   Topics discussed: induction of labor-next week if needed  kick counts and fetal movement  labor signs and symptoms  Reviewed OB labs   Tests next visit: none                RTO:                             This note was electronically signed.  Linda Wood, NIK  2024

## 2024-11-18 ENCOUNTER — ROUTINE PRENATAL (OUTPATIENT)
Dept: OBSTETRICS AND GYNECOLOGY | Facility: CLINIC | Age: 39
End: 2024-11-18
Payer: COMMERCIAL

## 2024-11-18 ENCOUNTER — PREP FOR SURGERY (OUTPATIENT)
Dept: OTHER | Facility: HOSPITAL | Age: 39
End: 2024-11-18
Payer: COMMERCIAL

## 2024-11-18 VITALS — SYSTOLIC BLOOD PRESSURE: 120 MMHG | BODY MASS INDEX: 27.81 KG/M2 | DIASTOLIC BLOOD PRESSURE: 72 MMHG | WEIGHT: 199.4 LBS

## 2024-11-18 DIAGNOSIS — O09.523 MULTIGRAVIDA OF ADVANCED MATERNAL AGE IN THIRD TRIMESTER: ICD-10-CM

## 2024-11-18 DIAGNOSIS — M21.541 ACQUIRED BILATERAL CLUB FEET: ICD-10-CM

## 2024-11-18 DIAGNOSIS — M21.542 ACQUIRED BILATERAL CLUB FEET: ICD-10-CM

## 2024-11-18 DIAGNOSIS — Z34.90 ENCOUNTER FOR INDUCTION OF LABOR: Primary | ICD-10-CM

## 2024-11-18 DIAGNOSIS — Z34.93 PRENATAL CARE IN THIRD TRIMESTER: Primary | ICD-10-CM

## 2024-11-18 PROCEDURE — 0502F SUBSEQUENT PRENATAL CARE: CPT | Performed by: OBSTETRICS & GYNECOLOGY

## 2024-11-18 RX ORDER — LIDOCAINE HYDROCHLORIDE 10 MG/ML
0.5 INJECTION, SOLUTION INFILTRATION; PERINEURAL ONCE AS NEEDED
Status: CANCELLED | OUTPATIENT
Start: 2024-11-18

## 2024-11-18 RX ORDER — SODIUM CHLORIDE 9 MG/ML
40 INJECTION, SOLUTION INTRAVENOUS AS NEEDED
Status: CANCELLED | OUTPATIENT
Start: 2024-11-18

## 2024-11-18 RX ORDER — PROMETHAZINE HYDROCHLORIDE 12.5 MG/1
12.5 SUPPOSITORY RECTAL EVERY 6 HOURS PRN
Status: CANCELLED | OUTPATIENT
Start: 2024-11-18

## 2024-11-18 RX ORDER — OXYTOCIN/0.9 % SODIUM CHLORIDE 30/500 ML
250 PLASTIC BAG, INJECTION (ML) INTRAVENOUS CONTINUOUS
Status: CANCELLED | OUTPATIENT
Start: 2024-11-18 | End: 2024-11-18

## 2024-11-18 RX ORDER — PROMETHAZINE HYDROCHLORIDE 12.5 MG/1
12.5 TABLET ORAL EVERY 6 HOURS PRN
Status: CANCELLED | OUTPATIENT
Start: 2024-11-18

## 2024-11-18 RX ORDER — OXYTOCIN/0.9 % SODIUM CHLORIDE 30/500 ML
999 PLASTIC BAG, INJECTION (ML) INTRAVENOUS ONCE
Status: CANCELLED | OUTPATIENT
Start: 2024-11-18 | End: 2024-11-18

## 2024-11-18 RX ORDER — SODIUM CHLORIDE 0.9 % (FLUSH) 0.9 %
10 SYRINGE (ML) INJECTION AS NEEDED
Status: CANCELLED | OUTPATIENT
Start: 2024-11-18

## 2024-11-18 RX ORDER — OXYTOCIN/0.9 % SODIUM CHLORIDE 30/500 ML
2-20 PLASTIC BAG, INJECTION (ML) INTRAVENOUS
Status: CANCELLED | OUTPATIENT
Start: 2024-11-18

## 2024-11-18 RX ORDER — ACETAMINOPHEN 325 MG/1
650 TABLET ORAL ONCE AS NEEDED
Status: CANCELLED | OUTPATIENT
Start: 2024-11-18

## 2024-11-18 RX ORDER — MORPHINE SULFATE 2 MG/ML
2 INJECTION, SOLUTION INTRAMUSCULAR; INTRAVENOUS ONCE AS NEEDED
Status: CANCELLED | OUTPATIENT
Start: 2024-11-18

## 2024-11-18 RX ORDER — CARBOPROST TROMETHAMINE 250 UG/ML
250 INJECTION, SOLUTION INTRAMUSCULAR ONCE AS NEEDED
Status: CANCELLED | OUTPATIENT
Start: 2024-11-18 | End: 2024-11-19

## 2024-11-18 RX ORDER — ONDANSETRON 4 MG/1
4 TABLET, ORALLY DISINTEGRATING ORAL ONCE AS NEEDED
Status: CANCELLED | OUTPATIENT
Start: 2024-11-18

## 2024-11-18 RX ORDER — SODIUM CHLORIDE 0.9 % (FLUSH) 0.9 %
10 SYRINGE (ML) INJECTION EVERY 12 HOURS SCHEDULED
Status: CANCELLED | OUTPATIENT
Start: 2024-11-18

## 2024-11-18 RX ORDER — HYDROCODONE BITARTRATE AND ACETAMINOPHEN 5; 325 MG/1; MG/1
1 TABLET ORAL EVERY 4 HOURS PRN
Status: CANCELLED | OUTPATIENT
Start: 2024-11-18 | End: 2024-11-25

## 2024-11-18 RX ORDER — METHYLERGONOVINE MALEATE 0.2 MG/ML
200 INJECTION INTRAVENOUS ONCE AS NEEDED
Status: CANCELLED | OUTPATIENT
Start: 2024-11-18 | End: 2024-11-19

## 2024-11-18 RX ORDER — MISOPROSTOL 200 UG/1
800 TABLET ORAL AS NEEDED
Status: CANCELLED | OUTPATIENT
Start: 2024-11-18

## 2024-11-18 RX ORDER — ONDANSETRON 2 MG/ML
4 INJECTION INTRAMUSCULAR; INTRAVENOUS EVERY 6 HOURS PRN
Status: CANCELLED | OUTPATIENT
Start: 2024-11-18

## 2024-11-18 RX ORDER — ACETAMINOPHEN 325 MG/1
650 TABLET ORAL EVERY 4 HOURS PRN
Status: CANCELLED | OUTPATIENT
Start: 2024-11-18

## 2024-11-18 RX ORDER — ONDANSETRON 4 MG/1
4 TABLET, ORALLY DISINTEGRATING ORAL EVERY 6 HOURS PRN
Status: CANCELLED | OUTPATIENT
Start: 2024-11-18

## 2024-11-18 RX ORDER — ONDANSETRON 2 MG/ML
4 INJECTION INTRAMUSCULAR; INTRAVENOUS ONCE AS NEEDED
Status: CANCELLED | OUTPATIENT
Start: 2024-11-18

## 2024-11-18 NOTE — PROGRESS NOTES
Prenatal Care Visit    Subjective   Chief Complaint   Patient presents with    Routine Prenatal Visit     No concerns.        History:   Bel is a  currently at 39w6d who presents for a prenatal care visit today.    No issues.    Social History    Tobacco Use      Smoking status: Former        Packs/day: 0.00        Years: 0.1 packs/day for 10.0 years (0.5 ttl pk-yrs)        Types: Cigarettes        Start date: 2010        Quit date: 2020        Years since quittin.5      Smokeless tobacco: Never       Objective   /72   Wt 90.4 kg (199 lb 6.4 oz)   LMP 2024   BMI 27.81 kg/m²   Physical Exam:  Normal, gestational age-appropriate exam today        Plan   Medical Decision Making:    I have reviewed the prenatal labs and ultrasound(s) today. I have reviewed the most recent prenatal progress note(s).    Diagnosis: Supervision of high risk pregnancy  AMA, NIPS NEG  Asthma  Bilateral fetal club feet  H/o LEEP   Tests/Orders/Rx today: No orders of the defined types were placed in this encounter.      Medication Management: None     Topics discussed: Prenatal care milestones  kick counts and fetal movement  PIH precautions   labor signs and symptoms  IOL tomorrow AM   Tests next visit: none   Next visit: none     Louie Oconnor MD  Obstetrics and Gynecology  [facility]

## 2024-11-19 ENCOUNTER — ANESTHESIA EVENT (OUTPATIENT)
Dept: LABOR AND DELIVERY | Facility: HOSPITAL | Age: 39
End: 2024-11-19
Payer: COMMERCIAL

## 2024-11-19 ENCOUNTER — ANESTHESIA (OUTPATIENT)
Dept: LABOR AND DELIVERY | Facility: HOSPITAL | Age: 39
End: 2024-11-19
Payer: COMMERCIAL

## 2024-11-19 ENCOUNTER — HOSPITAL ENCOUNTER (INPATIENT)
Facility: HOSPITAL | Age: 39
LOS: 2 days | Discharge: HOME | End: 2024-11-21
Attending: OBSTETRICS & GYNECOLOGY | Admitting: OBSTETRICS & GYNECOLOGY
Payer: COMMERCIAL

## 2024-11-19 ENCOUNTER — HOSPITAL ENCOUNTER (OUTPATIENT)
Dept: LABOR AND DELIVERY | Facility: HOSPITAL | Age: 39
Discharge: HOME | End: 2024-11-19
Payer: COMMERCIAL

## 2024-11-19 DIAGNOSIS — Z34.90 ENCOUNTER FOR INDUCTION OF LABOR: ICD-10-CM

## 2024-11-19 LAB
ABO GROUP BLD: NORMAL
BASOPHILS # BLD AUTO: 0.05 10*3/MM3 (ref 0–0.2)
BASOPHILS NFR BLD AUTO: 0.6 % (ref 0–1.5)
BILIRUB BLD-MCNC: NEGATIVE MG/DL
BLD GP AB SCN SERPL QL: NEGATIVE
CLARITY, POC: CLEAR
COLOR UR: YELLOW
DEPRECATED RDW RBC AUTO: 41.4 FL (ref 37–54)
EOSINOPHIL # BLD AUTO: 0.22 10*3/MM3 (ref 0–0.4)
EOSINOPHIL NFR BLD AUTO: 2.4 % (ref 0.3–6.2)
ERYTHROCYTE [DISTWIDTH] IN BLOOD BY AUTOMATED COUNT: 12.6 % (ref 12.3–15.4)
GLUCOSE UR STRIP-MCNC: NEGATIVE MG/DL
HCT VFR BLD AUTO: 30.2 % (ref 34–46.6)
HCT VFR BLD AUTO: 33.1 % (ref 34–46.6)
HGB BLD-MCNC: 10.1 G/DL (ref 12–15.9)
HGB BLD-MCNC: 11.2 G/DL (ref 12–15.9)
IMM GRANULOCYTES # BLD AUTO: 0.07 10*3/MM3 (ref 0–0.05)
IMM GRANULOCYTES NFR BLD AUTO: 0.8 % (ref 0–0.5)
KETONES UR QL: NEGATIVE
LEUKOCYTE EST, POC: NEGATIVE
LYMPHOCYTES # BLD AUTO: 1.99 10*3/MM3 (ref 0.7–3.1)
LYMPHOCYTES NFR BLD AUTO: 21.9 % (ref 19.6–45.3)
MCH RBC QN AUTO: 30.9 PG (ref 26.6–33)
MCHC RBC AUTO-ENTMCNC: 33.8 G/DL (ref 31.5–35.7)
MCV RBC AUTO: 91.2 FL (ref 79–97)
MONOCYTES # BLD AUTO: 1 10*3/MM3 (ref 0.1–0.9)
MONOCYTES NFR BLD AUTO: 11 % (ref 5–12)
NEUTROPHILS NFR BLD AUTO: 5.75 10*3/MM3 (ref 1.7–7)
NEUTROPHILS NFR BLD AUTO: 63.3 % (ref 42.7–76)
NITRITE UR-MCNC: NEGATIVE MG/ML
NRBC BLD AUTO-RTO: 0 /100 WBC (ref 0–0.2)
PH UR: 6 [PH] (ref 5–8)
PLATELET # BLD AUTO: 157 10*3/MM3 (ref 140–450)
PMV BLD AUTO: 9.4 FL (ref 6–12)
PROT UR STRIP-MCNC: ABNORMAL MG/DL
RBC # BLD AUTO: 3.63 10*6/MM3 (ref 3.77–5.28)
RBC # UR STRIP: NEGATIVE /UL
RH BLD: POSITIVE
SP GR UR: 1.02 (ref 1–1.03)
T&S EXPIRATION DATE: NORMAL
TREPONEMA PALLIDUM IGG+IGM AB [PRESENCE] IN SERUM OR PLASMA BY IMMUNOASSAY: NORMAL
UROBILINOGEN UR QL: NORMAL
WBC NRBC COR # BLD AUTO: 9.08 10*3/MM3 (ref 3.4–10.8)

## 2024-11-19 PROCEDURE — 86920 COMPATIBILITY TEST SPIN: CPT

## 2024-11-19 PROCEDURE — 25810000003 LACTATED RINGERS SOLUTION: Performed by: NURSE ANESTHETIST, CERTIFIED REGISTERED

## 2024-11-19 PROCEDURE — 80053 COMPREHEN METABOLIC PANEL: CPT | Performed by: MIDWIFE

## 2024-11-19 PROCEDURE — 85014 HEMATOCRIT: CPT | Performed by: MIDWIFE

## 2024-11-19 PROCEDURE — 86901 BLOOD TYPING SEROLOGIC RH(D): CPT | Performed by: OBSTETRICS & GYNECOLOGY

## 2024-11-19 PROCEDURE — 86900 BLOOD TYPING SEROLOGIC ABO: CPT

## 2024-11-19 PROCEDURE — 86850 RBC ANTIBODY SCREEN: CPT | Performed by: OBSTETRICS & GYNECOLOGY

## 2024-11-19 PROCEDURE — 59400 OBSTETRICAL CARE: CPT | Performed by: MIDWIFE

## 2024-11-19 PROCEDURE — 25010000002 METHYLERGONOVINE MALEATE PER 0.2 MG: Performed by: OBSTETRICS & GYNECOLOGY

## 2024-11-19 PROCEDURE — 59025 FETAL NON-STRESS TEST: CPT

## 2024-11-19 PROCEDURE — 85025 COMPLETE CBC W/AUTO DIFF WBC: CPT | Performed by: OBSTETRICS & GYNECOLOGY

## 2024-11-19 PROCEDURE — 25810000003 SODIUM CHLORIDE 0.9 % SOLUTION: Performed by: MIDWIFE

## 2024-11-19 PROCEDURE — 81002 URINALYSIS NONAUTO W/O SCOPE: CPT | Performed by: OBSTETRICS & GYNECOLOGY

## 2024-11-19 PROCEDURE — 86780 TREPONEMA PALLIDUM: CPT | Performed by: OBSTETRICS & GYNECOLOGY

## 2024-11-19 PROCEDURE — 86900 BLOOD TYPING SEROLOGIC ABO: CPT | Performed by: OBSTETRICS & GYNECOLOGY

## 2024-11-19 PROCEDURE — 36430 TRANSFUSION BLD/BLD COMPNT: CPT

## 2024-11-19 PROCEDURE — 85018 HEMOGLOBIN: CPT | Performed by: MIDWIFE

## 2024-11-19 PROCEDURE — 85007 BL SMEAR W/DIFF WBC COUNT: CPT | Performed by: MIDWIFE

## 2024-11-19 PROCEDURE — C1755 CATHETER, INTRASPINAL: HCPCS | Performed by: NURSE ANESTHETIST, CERTIFIED REGISTERED

## 2024-11-19 PROCEDURE — 85027 COMPLETE CBC AUTOMATED: CPT | Performed by: MIDWIFE

## 2024-11-19 PROCEDURE — 25010000002 LIDOCAINE-EPINEPHRINE 2 %-1:100000 SOLUTION: Performed by: NURSE ANESTHETIST, CERTIFIED REGISTERED

## 2024-11-19 PROCEDURE — 51703 INSERT BLADDER CATH COMPLEX: CPT

## 2024-11-19 PROCEDURE — P9016 RBC LEUKOCYTES REDUCED: HCPCS

## 2024-11-19 PROCEDURE — 25010000002 ONDANSETRON PER 1 MG: Performed by: NURSE ANESTHETIST, CERTIFIED REGISTERED

## 2024-11-19 PROCEDURE — 25010000002 ONDANSETRON PER 1 MG: Performed by: OBSTETRICS & GYNECOLOGY

## 2024-11-19 RX ORDER — PROMETHAZINE HYDROCHLORIDE 25 MG/1
25 TABLET ORAL EVERY 6 HOURS PRN
Status: DISCONTINUED | OUTPATIENT
Start: 2024-11-19 | End: 2024-11-21 | Stop reason: HOSPADM

## 2024-11-19 RX ORDER — HYDROCORTISONE 25 MG/G
1 CREAM TOPICAL 2 TIMES DAILY PRN
Status: DISCONTINUED | OUTPATIENT
Start: 2024-11-19 | End: 2024-11-21 | Stop reason: HOSPADM

## 2024-11-19 RX ORDER — PROMETHAZINE HYDROCHLORIDE 12.5 MG/1
12.5 TABLET ORAL EVERY 6 HOURS PRN
Status: DISCONTINUED | OUTPATIENT
Start: 2024-11-19 | End: 2024-11-21 | Stop reason: HOSPADM

## 2024-11-19 RX ORDER — BISACODYL 10 MG
10 SUPPOSITORY, RECTAL RECTAL DAILY PRN
Status: DISCONTINUED | OUTPATIENT
Start: 2024-11-20 | End: 2024-11-21 | Stop reason: HOSPADM

## 2024-11-19 RX ORDER — ONDANSETRON 4 MG/1
4 TABLET, ORALLY DISINTEGRATING ORAL EVERY 6 HOURS PRN
Status: DISCONTINUED | OUTPATIENT
Start: 2024-11-19 | End: 2024-11-21 | Stop reason: HOSPADM

## 2024-11-19 RX ORDER — SODIUM CHLORIDE 9 MG/ML
125 INJECTION, SOLUTION INTRAVENOUS CONTINUOUS
Status: ACTIVE | OUTPATIENT
Start: 2024-11-19 | End: 2024-11-20

## 2024-11-19 RX ORDER — ONDANSETRON 4 MG/1
4 TABLET, ORALLY DISINTEGRATING ORAL ONCE AS NEEDED
Status: DISCONTINUED | OUTPATIENT
Start: 2024-11-19 | End: 2024-11-20 | Stop reason: HOSPADM

## 2024-11-19 RX ORDER — SODIUM CHLORIDE 0.9 % (FLUSH) 0.9 %
10 SYRINGE (ML) INJECTION AS NEEDED
Status: DISCONTINUED | OUTPATIENT
Start: 2024-11-19 | End: 2024-11-21 | Stop reason: HOSPADM

## 2024-11-19 RX ORDER — ONDANSETRON 4 MG/1
4 TABLET, ORALLY DISINTEGRATING ORAL EVERY 8 HOURS PRN
Status: DISCONTINUED | OUTPATIENT
Start: 2024-11-19 | End: 2024-11-21 | Stop reason: HOSPADM

## 2024-11-19 RX ORDER — SODIUM CHLORIDE 0.9 % (FLUSH) 0.9 %
1-10 SYRINGE (ML) INJECTION AS NEEDED
Status: DISCONTINUED | OUTPATIENT
Start: 2024-11-19 | End: 2024-11-21 | Stop reason: HOSPADM

## 2024-11-19 RX ORDER — OXYTOCIN/0.9 % SODIUM CHLORIDE 30/500 ML
2-20 PLASTIC BAG, INJECTION (ML) INTRAVENOUS
Status: DISCONTINUED | OUTPATIENT
Start: 2024-11-19 | End: 2024-11-21 | Stop reason: HOSPADM

## 2024-11-19 RX ORDER — PROMETHAZINE HYDROCHLORIDE 12.5 MG/1
12.5 SUPPOSITORY RECTAL EVERY 6 HOURS PRN
Status: DISCONTINUED | OUTPATIENT
Start: 2024-11-19 | End: 2024-11-21 | Stop reason: HOSPADM

## 2024-11-19 RX ORDER — TRANEXAMIC ACID 10 MG/ML
1000 INJECTION, SOLUTION INTRAVENOUS ONCE
Status: COMPLETED | OUTPATIENT
Start: 2024-11-19 | End: 2024-11-19

## 2024-11-19 RX ORDER — MORPHINE SULFATE 2 MG/ML
2 INJECTION, SOLUTION INTRAMUSCULAR; INTRAVENOUS ONCE AS NEEDED
Status: DISCONTINUED | OUTPATIENT
Start: 2024-11-19 | End: 2024-11-20 | Stop reason: HOSPADM

## 2024-11-19 RX ORDER — ACETAMINOPHEN 325 MG/1
650 TABLET ORAL EVERY 4 HOURS PRN
Status: DISCONTINUED | OUTPATIENT
Start: 2024-11-19 | End: 2024-11-21 | Stop reason: HOSPADM

## 2024-11-19 RX ORDER — ACETAMINOPHEN 325 MG/1
650 TABLET ORAL ONCE AS NEEDED
Status: DISCONTINUED | OUTPATIENT
Start: 2024-11-19 | End: 2024-11-20 | Stop reason: HOSPADM

## 2024-11-19 RX ORDER — DOCUSATE SODIUM 100 MG/1
100 CAPSULE, LIQUID FILLED ORAL 2 TIMES DAILY PRN
Status: DISCONTINUED | OUTPATIENT
Start: 2024-11-19 | End: 2024-11-21 | Stop reason: HOSPADM

## 2024-11-19 RX ORDER — HYDROCODONE BITARTRATE AND ACETAMINOPHEN 10; 325 MG/1; MG/1
1 TABLET ORAL EVERY 4 HOURS PRN
Status: DISCONTINUED | OUTPATIENT
Start: 2024-11-19 | End: 2024-11-21 | Stop reason: HOSPADM

## 2024-11-19 RX ORDER — SODIUM CHLORIDE 0.9 % (FLUSH) 0.9 %
10 SYRINGE (ML) INJECTION EVERY 12 HOURS SCHEDULED
Status: DISCONTINUED | OUTPATIENT
Start: 2024-11-19 | End: 2024-11-21 | Stop reason: HOSPADM

## 2024-11-19 RX ORDER — EPHEDRINE SULFATE 5 MG/ML
5 INJECTION INTRAVENOUS
Status: DISCONTINUED | OUTPATIENT
Start: 2024-11-19 | End: 2024-11-20 | Stop reason: HOSPADM

## 2024-11-19 RX ORDER — ONDANSETRON 2 MG/ML
4 INJECTION INTRAMUSCULAR; INTRAVENOUS EVERY 6 HOURS PRN
Status: DISCONTINUED | OUTPATIENT
Start: 2024-11-19 | End: 2024-11-21 | Stop reason: HOSPADM

## 2024-11-19 RX ORDER — CARBOPROST TROMETHAMINE 250 UG/ML
250 INJECTION, SOLUTION INTRAMUSCULAR ONCE AS NEEDED
Status: DISCONTINUED | OUTPATIENT
Start: 2024-11-19 | End: 2024-11-20 | Stop reason: HOSPADM

## 2024-11-19 RX ORDER — ACETAMINOPHEN 325 MG/1
650 TABLET ORAL EVERY 6 HOURS PRN
Status: DISCONTINUED | OUTPATIENT
Start: 2024-11-19 | End: 2024-11-21 | Stop reason: HOSPADM

## 2024-11-19 RX ORDER — IBUPROFEN 600 MG/1
600 TABLET, FILM COATED ORAL EVERY 6 HOURS PRN
Status: DISCONTINUED | OUTPATIENT
Start: 2024-11-19 | End: 2024-11-21 | Stop reason: HOSPADM

## 2024-11-19 RX ORDER — OXYTOCIN/0.9 % SODIUM CHLORIDE 30/500 ML
999 PLASTIC BAG, INJECTION (ML) INTRAVENOUS ONCE
Status: DISCONTINUED | OUTPATIENT
Start: 2024-11-19 | End: 2024-11-20 | Stop reason: HOSPADM

## 2024-11-19 RX ORDER — MISOPROSTOL 200 UG/1
800 TABLET ORAL AS NEEDED
Status: DISCONTINUED | OUTPATIENT
Start: 2024-11-19 | End: 2024-11-20 | Stop reason: HOSPADM

## 2024-11-19 RX ORDER — METHYLERGONOVINE MALEATE 0.2 MG/ML
200 INJECTION INTRAVENOUS ONCE AS NEEDED
Status: COMPLETED | OUTPATIENT
Start: 2024-11-19 | End: 2024-11-19

## 2024-11-19 RX ORDER — ONDANSETRON 2 MG/ML
4 INJECTION INTRAMUSCULAR; INTRAVENOUS ONCE AS NEEDED
Status: COMPLETED | OUTPATIENT
Start: 2024-11-19 | End: 2024-11-19

## 2024-11-19 RX ORDER — LIDOCAINE HYDROCHLORIDE 10 MG/ML
0.5 INJECTION, SOLUTION INFILTRATION; PERINEURAL ONCE AS NEEDED
Status: DISCONTINUED | OUTPATIENT
Start: 2024-11-19 | End: 2024-11-21 | Stop reason: HOSPADM

## 2024-11-19 RX ORDER — OXYTOCIN/0.9 % SODIUM CHLORIDE 30/500 ML
125 PLASTIC BAG, INJECTION (ML) INTRAVENOUS CONTINUOUS PRN
Status: DISCONTINUED | OUTPATIENT
Start: 2024-11-19 | End: 2024-11-21 | Stop reason: HOSPADM

## 2024-11-19 RX ORDER — MAGNESIUM CARB/ALUMINUM HYDROX 105-160MG
30 TABLET,CHEWABLE ORAL ONCE
Status: COMPLETED | OUTPATIENT
Start: 2024-11-19 | End: 2024-11-19

## 2024-11-19 RX ORDER — SODIUM CHLORIDE 9 MG/ML
40 INJECTION, SOLUTION INTRAVENOUS AS NEEDED
Status: DISCONTINUED | OUTPATIENT
Start: 2024-11-19 | End: 2024-11-21 | Stop reason: HOSPADM

## 2024-11-19 RX ORDER — DIPHENHYDRAMINE HCL 25 MG
25 CAPSULE ORAL NIGHTLY PRN
Status: DISCONTINUED | OUTPATIENT
Start: 2024-11-19 | End: 2024-11-21 | Stop reason: HOSPADM

## 2024-11-19 RX ORDER — HYDROCODONE BITARTRATE AND ACETAMINOPHEN 5; 325 MG/1; MG/1
1 TABLET ORAL EVERY 4 HOURS PRN
Status: DISCONTINUED | OUTPATIENT
Start: 2024-11-19 | End: 2024-11-21 | Stop reason: HOSPADM

## 2024-11-19 RX ORDER — OXYTOCIN/0.9 % SODIUM CHLORIDE 30/500 ML
250 PLASTIC BAG, INJECTION (ML) INTRAVENOUS CONTINUOUS
Status: ACTIVE | OUTPATIENT
Start: 2024-11-19 | End: 2024-11-19

## 2024-11-19 RX ORDER — HYDROCODONE BITARTRATE AND ACETAMINOPHEN 5; 325 MG/1; MG/1
1 TABLET ORAL EVERY 4 HOURS PRN
Status: DISCONTINUED | OUTPATIENT
Start: 2024-11-19 | End: 2024-11-20 | Stop reason: HOSPADM

## 2024-11-19 RX ORDER — PRENATAL VIT/IRON FUM/FOLIC AC 27MG-0.8MG
1 TABLET ORAL DAILY
Status: DISCONTINUED | OUTPATIENT
Start: 2024-11-19 | End: 2024-11-21 | Stop reason: HOSPADM

## 2024-11-19 RX ORDER — LIDOCAINE HYDROCHLORIDE AND EPINEPHRINE BITARTRATE 20; .01 MG/ML; MG/ML
INJECTION, SOLUTION SUBCUTANEOUS AS NEEDED
Status: DISCONTINUED | OUTPATIENT
Start: 2024-11-19 | End: 2024-11-20 | Stop reason: SURG

## 2024-11-19 RX ORDER — ONDANSETRON 2 MG/ML
4 INJECTION INTRAMUSCULAR; INTRAVENOUS ONCE AS NEEDED
Status: DISCONTINUED | OUTPATIENT
Start: 2024-11-19 | End: 2024-11-20 | Stop reason: HOSPADM

## 2024-11-19 RX ORDER — PROMETHAZINE HYDROCHLORIDE 12.5 MG/1
12.5 TABLET ORAL EVERY 6 HOURS PRN
Status: DISCONTINUED | OUTPATIENT
Start: 2024-11-19 | End: 2024-11-20 | Stop reason: HOSPADM

## 2024-11-19 RX ORDER — CITRIC ACID/SODIUM CITRATE 334-500MG
30 SOLUTION, ORAL ORAL ONCE
Status: DISCONTINUED | OUTPATIENT
Start: 2024-11-19 | End: 2024-11-20 | Stop reason: HOSPADM

## 2024-11-19 RX ADMIN — TRANEXAMIC ACID 1000 MG: 10 INJECTION, SOLUTION INTRAVENOUS at 15:38

## 2024-11-19 RX ADMIN — ONDANSETRON 4 MG: 2 INJECTION INTRAMUSCULAR; INTRAVENOUS at 15:09

## 2024-11-19 RX ADMIN — LIDOCAINE HYDROCHLORIDE AND EPINEPHRINE 5 ML: 20; 10 INJECTION, SOLUTION INFILTRATION; PERINEURAL at 12:15

## 2024-11-19 RX ADMIN — SODIUM CHLORIDE 125 ML/HR: 9 INJECTION, SOLUTION INTRAVENOUS at 20:30

## 2024-11-19 RX ADMIN — METHYLERGONOVINE MALEATE 200 MCG: 0.2 INJECTION, SOLUTION INTRAMUSCULAR; INTRAVENOUS at 15:08

## 2024-11-19 RX ADMIN — Medication 12 ML/HR: at 10:40

## 2024-11-19 RX ADMIN — SODIUM CHLORIDE, POTASSIUM CHLORIDE, SODIUM LACTATE AND CALCIUM CHLORIDE 1000 ML: 600; 310; 30; 20 INJECTION, SOLUTION INTRAVENOUS at 07:00

## 2024-11-19 RX ADMIN — MINERAL OIL 30 ML: 1000 SOLUTION ORAL at 12:23

## 2024-11-19 RX ADMIN — MISOPROSTOL 800 MCG: 200 TABLET ORAL at 15:29

## 2024-11-19 NOTE — ANESTHESIA PROCEDURE NOTES
Labor Epidural      Patient reassessed immediately prior to procedure    Patient location during procedure: OB  Start Time: 11/19/2024 10:32 AM  Performed By  CRNA/CAA: Héctor Mack CRNA  Preanesthetic Checklist  Completed: patient identified, IV checked, site marked, risks and benefits discussed, surgical consent, monitors and equipment checked, pre-op evaluation and timeout performed  Additional Notes  Risks discussed , including but not limited to:  Headache, itching, n&v, infection, failure, decreased blood pressure, permanent chronic/back pain, nerve damage, paralysis, etc. All questions answered and informed consent obtained. Skin localized with lidocaine skin wheel. Test dose2+3ml of 1.5% lidocaine with 1:200,000 epi.  Prep:  Pt Position:sitting  Sterile Tech:cap, gloves, mask and sterile barrier  Prep:chlorhexidine gluconate and isopropyl alcohol  Monitoring:blood pressure monitoring and continuous pulse oximetry  Epidural Block Procedure:  Approach:midline  Guidance:landmark technique and palpation technique  Location:L3-L4  Needle Type:Tuohy  Needle Gauge:18 G  Loss of Resistance Medium: air  Loss of Resistance: 7cm  Cath Depth at skin:14 cm  Paresthesia: none  Aspiration:negative  Test Dose:negative  Number of Attempts: 1  Post Assessment:  Dressing:occlusive dressing applied and secured with tape  Pt Tolerance:patient tolerated the procedure well with no apparent complications  Complications:no

## 2024-11-19 NOTE — ANESTHESIA PREPROCEDURE EVALUATION
Anesthesia Evaluation     Patient summary reviewed and Nursing notes reviewed   NPO Solid Status: > 8 hours  NPO Liquid Status: > 2 hours           Airway   Mallampati: II  TM distance: >3 FB  Neck ROM: full  No difficulty expected  Dental - normal exam     Pulmonary - normal exam   (+) a smoker Former, asthma,  Cardiovascular - negative cardio ROS and normal exam  Exercise tolerance: good (4-7 METS)        Neuro/Psych  (+) psychiatric history (panic attacks) Depression  GI/Hepatic/Renal/Endo - negative ROS     Musculoskeletal (-) negative ROS    Abdominal  - normal exam   Substance History - negative use     OB/GYN    (+) Pregnant        Other        ROS/Med Hx Other: Plt 157                Anesthesia Plan    ASA 2     epidural     (Risk and benefits discussed, discussed risk of nausea, vomiting, itching, low blood pressure, post-procedural back pain, PDPH, and infection. Patient reports understanding. Continue with POC)    Anesthetic plan, risks, benefits, and alternatives have been provided, discussed and informed consent has been obtained with: patient.  Pre-procedure education provided

## 2024-11-19 NOTE — L&D DELIVERY NOTE
[facility]   Vaginal Delivery Note    Patient Name: Bel Brown  : 1985  MRN: [id number]    Date of Delivery: 2024    Diagnosis     Pre & Post-Delivery:  Intrauterine pregnancy at 40w0d  Labor status:      Encounter for induction of labor             Problem List    Transfer to Postpartum     Review the Delivery Report for details.     Delivery     Delivery: Vaginal, Spontaneous    YOB: 2024   Time of Birth:  Gestational Age 1:52 PM  40w0d     Anesthesia: Epidural    Delivering clinician: Linda SARMIENTO Foster   Forceps?   No   Vacuum? No    Shoulder dystocia present: Yes Shoulder Dystocia Details  Dystocia Present? Yes  Time head delivered: 2024  1:52 PM  Gentle attempt at traction, assisted by maternal expulsive forces:    If no, why:    Anterior shoulder:    Time recognized:      Time help called:   Called by:     Time provider arrived:   Provider who arrived:     Time NICU arrived:   NICU staff:     Time additional staff arrived:   Additional staff:            Delivery narrative:  Bel progressed to C+P and pushed effectively but slow progress. Top third of head was out but no further, MLE performed.  Mouth and nose bulb suctioned on perineum.  Shoulder dystocia recognized, bed flat, Blessing and suprapubic pressure applied. Left anterior shoulder was delivered with maneuvers and maternal effort followed by posterior shoulder. Nuchal arm noted.  viable male. Infant stimulated, dried, and placed on mom's abdomen. Infant with lusty cry and spontaneous respirations. Delayed cord double clamped; cut by dad. Cord blood obtained. Placenta delivered Schultze intact with 3V cord. Pitocin 20 units IV given.  MLE repaired.  FF @ U+2 , light rubra lochia. Shortly after delivery, she began having gushes of blood. Uterus firm after massage. Methergine IM, Cytotec rectal, and TXA IV given. EBL > 2000. 1620: Dr Correia was notified. 1630 Dr Correia did exam and manually removed  "large amount of clots and piece of retained placenta. Bleeding improved and FF. VSS.       Infant     Findings: male infant     Infant observations: Weight: 4394 g (9 lb 11 oz)  Length: 20.5 in  Observations/Comments:        Apgars: 9  @ 1 minute /    10  @ 5 minutes   Infant Name: Emerson     Placenta & Cord         Placenta delivered  Spontaneous at   11/19/2024  1:59 PM    Cord:   present.   Nuchal Cord?  no   Cord blood obtained: Yes   Cord gases obtained:  No   Cord gas results: Venous:  No results found for: \"PHCVEN\", \"BECVEN\"    Arterial:  No results found for: \"PHCART\", \"BECART\"     Repair     Episiotomy: Median    Yes  Suture used for repair: 3-0 chromic gut    Lacerations: No   Estimated Blood Loss: 250 ml     Quantitative Blood Loss:    QBL from VAG DEL: 1164 (11/19/24 1638)     Complications     Postpartum hemorrhage    Disposition     Mother to Remain in LD  in stable condition currently.  Baby to remains with mom  in stable condition currently.    Linda Wood CNM  11/19/24  17:25 EST        "

## 2024-11-19 NOTE — NON STRESS TEST
Bel QUINTERO Stephanie, a  at 40w0d with an LANCE of 2024, by Ultrasound, was seen at [facility] for a nonstress test.    Chief Complaint   Patient presents with    Scheduled Induction     40+0, elective IOL, denies bleeding or LOF, +FM       Patient Active Problem List   Diagnosis    Mild intermittent asthma without complication    Panic disorder    AMA (advanced maternal age) multigravida 35+    Hx LEEP (loop electrosurgical excision procedure), cervix, pregnancy    fetal bilateral club feet    Low lying placenta, antepartum    Encounter for induction of labor       Start Time: 0630  Stop Time: 0700    Interpretation A  Nonstress Test Interpretation A: Reactive
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
## 2024-11-20 LAB
ALBUMIN SERPL-MCNC: 2.4 G/DL (ref 3.5–5.2)
ALBUMIN/GLOB SERPL: 1.3 G/DL
ALP SERPL-CCNC: 85 U/L (ref 39–117)
ALT SERPL W P-5'-P-CCNC: 8 U/L (ref 1–33)
ANION GAP SERPL CALCULATED.3IONS-SCNC: 9.6 MMOL/L (ref 5–15)
AST SERPL-CCNC: 17 U/L (ref 1–32)
BASOPHILS # BLD AUTO: 0.03 10*3/MM3 (ref 0–0.2)
BASOPHILS NFR BLD AUTO: 0.2 % (ref 0–1.5)
BILIRUB SERPL-MCNC: 0.3 MG/DL (ref 0–1.2)
BUN SERPL-MCNC: 8 MG/DL (ref 6–20)
BUN/CREAT SERPL: 17 (ref 7–25)
CALCIUM SPEC-SCNC: 7.8 MG/DL (ref 8.6–10.5)
CHLORIDE SERPL-SCNC: 103 MMOL/L (ref 98–107)
CO2 SERPL-SCNC: 20.4 MMOL/L (ref 22–29)
CREAT SERPL-MCNC: 0.47 MG/DL (ref 0.57–1)
DEPRECATED RDW RBC AUTO: 41.3 FL (ref 37–54)
DEPRECATED RDW RBC AUTO: 42.6 FL (ref 37–54)
EGFRCR SERPLBLD CKD-EPI 2021: 124.4 ML/MIN/1.73
EOSINOPHIL # BLD AUTO: 0.06 10*3/MM3 (ref 0–0.4)
EOSINOPHIL NFR BLD AUTO: 0.5 % (ref 0.3–6.2)
ERYTHROCYTE [DISTWIDTH] IN BLOOD BY AUTOMATED COUNT: 12.7 % (ref 12.3–15.4)
ERYTHROCYTE [DISTWIDTH] IN BLOOD BY AUTOMATED COUNT: 13.2 % (ref 12.3–15.4)
GLOBULIN UR ELPH-MCNC: 1.8 GM/DL
GLUCOSE SERPL-MCNC: 97 MG/DL (ref 65–99)
HCT VFR BLD AUTO: 22.7 % (ref 34–46.6)
HCT VFR BLD AUTO: 22.8 % (ref 34–46.6)
HGB BLD-MCNC: 8.1 G/DL (ref 12–15.9)
HGB BLD-MCNC: 8.2 G/DL (ref 12–15.9)
IMM GRANULOCYTES # BLD AUTO: 0.06 10*3/MM3 (ref 0–0.05)
IMM GRANULOCYTES NFR BLD AUTO: 0.5 % (ref 0–0.5)
LYMPHOCYTES # BLD AUTO: 1.79 10*3/MM3 (ref 0.7–3.1)
LYMPHOCYTES # BLD MANUAL: 1.27 10*3/MM3 (ref 0.7–3.1)
LYMPHOCYTES NFR BLD AUTO: 14.7 % (ref 19.6–45.3)
LYMPHOCYTES NFR BLD MANUAL: 8 % (ref 5–12)
MCH RBC QN AUTO: 32 PG (ref 26.6–33)
MCH RBC QN AUTO: 32.3 PG (ref 26.6–33)
MCHC RBC AUTO-ENTMCNC: 35.7 G/DL (ref 31.5–35.7)
MCHC RBC AUTO-ENTMCNC: 36 G/DL (ref 31.5–35.7)
MCV RBC AUTO: 89.7 FL (ref 79–97)
MCV RBC AUTO: 89.8 FL (ref 79–97)
MONOCYTES # BLD AUTO: 0.93 10*3/MM3 (ref 0.1–0.9)
MONOCYTES # BLD: 1.45 10*3/MM3 (ref 0.1–0.9)
MONOCYTES NFR BLD AUTO: 7.6 % (ref 5–12)
NEUTROPHILS # BLD AUTO: 15.44 10*3/MM3 (ref 1.7–7)
NEUTROPHILS NFR BLD AUTO: 76.5 % (ref 42.7–76)
NEUTROPHILS NFR BLD AUTO: 9.29 10*3/MM3 (ref 1.7–7)
NEUTROPHILS NFR BLD MANUAL: 67 % (ref 42.7–76)
NEUTS BAND NFR BLD MANUAL: 18 % (ref 0–5)
NRBC BLD AUTO-RTO: 0 /100 WBC (ref 0–0.2)
PLATELET # BLD AUTO: 132 10*3/MM3 (ref 140–450)
PLATELET # BLD AUTO: 188 10*3/MM3 (ref 140–450)
PMV BLD AUTO: 9.4 FL (ref 6–12)
PMV BLD AUTO: 9.6 FL (ref 6–12)
POTASSIUM SERPL-SCNC: 4.1 MMOL/L (ref 3.5–5.2)
PROT SERPL-MCNC: 4.2 G/DL (ref 6–8.5)
RBC # BLD AUTO: 2.53 10*6/MM3 (ref 3.77–5.28)
RBC # BLD AUTO: 2.54 10*6/MM3 (ref 3.77–5.28)
RBC MORPH BLD: NORMAL
SMALL PLATELETS BLD QL SMEAR: ADEQUATE
SODIUM SERPL-SCNC: 133 MMOL/L (ref 136–145)
VARIANT LYMPHS NFR BLD MANUAL: 7 % (ref 19.6–45.3)
WBC MORPH BLD: NORMAL
WBC NRBC COR # BLD AUTO: 12.16 10*3/MM3 (ref 3.4–10.8)
WBC NRBC COR # BLD AUTO: 18.17 10*3/MM3 (ref 3.4–10.8)

## 2024-11-20 PROCEDURE — 85025 COMPLETE CBC W/AUTO DIFF WBC: CPT | Performed by: OBSTETRICS & GYNECOLOGY

## 2024-11-20 PROCEDURE — 0503F POSTPARTUM CARE VISIT: CPT | Performed by: OBSTETRICS & GYNECOLOGY

## 2024-11-20 PROCEDURE — 25010000002 CEFAZOLIN PER 500 MG: Performed by: OBSTETRICS & GYNECOLOGY

## 2024-11-20 PROCEDURE — 86900 BLOOD TYPING SEROLOGIC ABO: CPT

## 2024-11-20 PROCEDURE — 36430 TRANSFUSION BLD/BLD COMPNT: CPT

## 2024-11-20 PROCEDURE — P9016 RBC LEUKOCYTES REDUCED: HCPCS

## 2024-11-20 RX ORDER — FERROUS SULFATE 324(65)MG
324 TABLET, DELAYED RELEASE (ENTERIC COATED) ORAL 2 TIMES DAILY WITH MEALS
Status: DISCONTINUED | OUTPATIENT
Start: 2024-11-20 | End: 2024-11-21 | Stop reason: HOSPADM

## 2024-11-20 RX ADMIN — PRENATAL VITAMINS-IRON FUMARATE 27 MG IRON-FOLIC ACID 0.8 MG TABLET 1 TABLET: at 10:23

## 2024-11-20 RX ADMIN — IBUPROFEN 600 MG: 600 TABLET ORAL at 06:10

## 2024-11-20 RX ADMIN — SODIUM CHLORIDE 2000 MG: 9 INJECTION, SOLUTION INTRAVENOUS at 09:10

## 2024-11-20 RX ADMIN — BENZOCAINE AND LEVOMENTHOL: 200; 5 SPRAY TOPICAL at 15:37

## 2024-11-20 RX ADMIN — FERROUS SULFATE TAB EC 324 MG (65 MG FE EQUIVALENT) 324 MG: 324 (65 FE) TABLET DELAYED RESPONSE at 10:23

## 2024-11-20 RX ADMIN — ACETAMINOPHEN 650 MG: 325 TABLET, FILM COATED ORAL at 00:22

## 2024-11-20 RX ADMIN — BENZOCAINE AND LEVOMENTHOL 1 APPLICATION: 200; 5 SPRAY TOPICAL at 00:23

## 2024-11-20 RX ADMIN — FERROUS SULFATE TAB EC 324 MG (65 MG FE EQUIVALENT) 324 MG: 324 (65 FE) TABLET DELAYED RESPONSE at 17:59

## 2024-11-20 RX ADMIN — IBUPROFEN 600 MG: 600 TABLET ORAL at 11:59

## 2024-11-20 RX ADMIN — ACETAMINOPHEN 650 MG: 325 TABLET, FILM COATED ORAL at 10:23

## 2024-11-20 RX ADMIN — DOCUSATE SODIUM 100 MG: 100 CAPSULE, LIQUID FILLED ORAL at 10:48

## 2024-11-20 RX ADMIN — ACETAMINOPHEN 650 MG: 325 TABLET, FILM COATED ORAL at 15:38

## 2024-11-20 NOTE — NURSING NOTE
[facility] CNM notified of pt's UOP 75ml randy colored urine. Last time bladder emptied was at 1600-50ml. Orders received for a 500ml NS bolus then 125ml/hr. Notified CNM that CBC has been ordered for 2330 (4 hrs after 1st unit completed).

## 2024-11-20 NOTE — PROGRESS NOTES
"[facility]  Vaginal Delivery Progress Note    Subjective   Subjective  Postpartum Day 1: Vaginal Delivery    The patient feels well.  Her pain is well controlled. She is ambulating well.  Patient describes her bleeding as  light . No dizziness or lightheadedness with ambulation    Breastfeeding: without difficulty.    Objective     Objective   Vital Signs Range for the last 24 hours  Temperature: Temp:  [97.1 °F (36.2 °C)-98.1 °F (36.7 °C)] 98.1 °F (36.7 °C)   Temp Source: Temp src: Oral   BP: BP: ()/(43-95) 107/53   Pulse: Heart Rate:  [] 83   Respirations: Resp:  [16-18] 18   SPO2: SpO2:  [94 %-100 %] 100 %   O2 Amount (l/min):     O2 Devices Device (Oxygen Therapy): room air   Weight:       Admit Height:  Height: 180.3 cm (71\")    Physical Exam:  General:  no acute distresss.  Abdomen: Fundus: appropriate, firm, non tender  Extremities: normal, atraumatic, no cyanosis, and trace edema.     Lab results reviewed:  Yes CBC & Differential (11/20/2024 08:35)   Rubella:  No results found for: \"RUBELLAIGGIN\" Nurse Transcribed from prenatal record --    Rubella Antibodies, IgG   Date Value Ref Range Status   05/07/2024 1.73 Immune >0.99 index Final     Comment:                                     Non-immune       <0.90                                  Equivocal  0.90 - 0.99                                  Immune           >0.99       Rh Status:    RH type   Date Value Ref Range Status   11/19/2024 Positive  Final     Rh Factor   Date Value Ref Range Status   05/07/2024 Positive  Final     Comment:     Please note: Prior records for this patient's ABO / Rh type are not  available for additional verification.       Immunizations:   Immunization History   Administered Date(s) Administered    COVID-19 (MODERNA) 1st,2nd,3rd Dose Monovalent 11/04/2021    COVID-19 (PFIZER) Purple Cap Monovalent 03/17/2021, 04/07/2021    Fluzone  >6mos 10/25/2019    Fluzone Quad >6mos (Multi-dose) 10/25/2019, 10/25/2021    " Hepatitis A 2019    Influenza, Unspecified 10/20/2020    Tdap 2021       Assessment & Plan   Assessment & Plan    Encounter for induction of labor     (spontaneous vaginal delivery)    Other immediate postpartum hemorrhage      Bel Brown is Day 1  post-partum  Vaginal, Spontaneous  .  anemia    Plan:  Continue current care. Iron supplementation      Melisa Villasenor PA-C  2024  09:58 EST

## 2024-11-20 NOTE — NURSING NOTE
[facility] CNM notified of pt status including recent lab results, VS. Pt just ambulated to  with my stand-by assistance for pericare. Pt was asymptomatic ambulating from labor bed to bathroom and back, VS taken sitting at bedside prior to ambulation and then upon return to bed and both sets were similar to last 5 hourly VS readings. Order received to transfuse 1 unit now, repeat H&H 4 hours after transfusion complete and DC FC @0600.

## 2024-11-20 NOTE — PROGRESS NOTES
Isiah Brown  : 1985  MRN: [id number]  CSN: [id number]    Postpartum Day #1  History of Present Illness:  Her pain is well controlled.  Vaginal bleeding is appropriate amount.  She is not passing gas and has not had a bowel movement.  Upon review of her respiratory system, she has no respiratory symptoms and and denies SOB, cough, wheezing, chest pain.  Patient reports having difficulty ambulating.  She has been up out of bed x 1.  She ambulated after her first unit of packed red blood cells.  She became short of breath, palpitations, dizzy and lightheaded.  She is sitting upright in the bed this morning and is asymptomatic.  She received a second unit of packed red blood cells.  She has not had any further significant bleeding.  She does report however her uterus is tender to palpation.    Vitals:  Min/max vitals past 24 hours:   Temp  Min: 97.1 °F (36.2 °C)  Max: 98.1 °F (36.7 °C)  BP  Min: 70/46  Max: 150/56  Pulse  Min: 66  Max: 151  Resp  Min: 16  Max: 18    Fluid balance past 24 hours:  I/O last 3 completed shifts:  In: 2656.5 [I.V.:1000; Blood:656.5; IV Piggyback:1000]  Out: 5976 [Urine:2375; Blood:3601]         Physical Exam:  General Appearance: well developed, well nourished, not in any acute distress; pallor   Respiratory: breathing is unlabored, clear to auscultation bilaterally   CV: regular rate and rhythm, S1, S2 normal, no murmur, click, rub or gallop   Abdomen: soft, no palpable masses; fundus firm with +tenderness   Pelvic: not performed   Extremities: moves extremities well; normal appearance with no cyanosis or edema and no calf tenderness     Results Review:  I reviewed the patient's new clinical results.    Lab Results (last 24 hours)       Procedure Component Value Units Date/Time    Comprehensive Metabolic Panel [[id number]]  (Abnormal) Collected: 24 5124    Specimen: Blood Updated: 24 0017     Glucose 97 mg/dL      BUN 8 mg/dL      Creatinine 0.47 mg/dL       Sodium 133 mmol/L      Potassium 4.1 mmol/L      Chloride 103 mmol/L      CO2 20.4 mmol/L      Calcium 7.8 mg/dL      Total Protein 4.2 g/dL      Albumin 2.4 g/dL      ALT (SGPT) 8 U/L      AST (SGOT) 17 U/L      Alkaline Phosphatase 85 U/L      Total Bilirubin 0.3 mg/dL      Globulin 1.8 gm/dL      A/G Ratio 1.3 g/dL      BUN/Creatinine Ratio 17.0     Anion Gap 9.6 mmol/L      eGFR 124.4 mL/min/1.73     Narrative:      GFR Normal >60  Chronic Kidney Disease <60  Kidney Failure <15      CBC & Differential [[id number]]  (Abnormal) Collected: 11/19/24 2345    Specimen: Blood Updated: 11/20/24 0016    Narrative:      The following orders were created for panel order CBC & Differential.  Procedure                               Abnormality         Status                     ---------                               -----------         ------                     Manual Differential[[id number]]          Abnormal            Final result               CBC Auto Differential[[id number]]        Abnormal            Final result                 Please view results for these tests on the individual orders.    Manual Differential [[id number]]  (Abnormal) Collected: 11/19/24 2345    Specimen: Blood Updated: 11/20/24 0016     Neutrophil % 67.0 %      Lymphocyte % 7.0 %      Monocyte % 8.0 %      Bands %  18.0 %      Neutrophils Absolute 15.44 10*3/mm3      Lymphocytes Absolute 1.27 10*3/mm3      Monocytes Absolute 1.45 10*3/mm3      RBC Morphology Normal     WBC Morphology Normal     Platelet Estimate Adequate    CBC Auto Differential [[id number]]  (Abnormal) Collected: 11/19/24 2345    Specimen: Blood Updated: 11/20/24 0010     WBC 18.17 10*3/mm3      RBC 2.53 10*6/mm3      Hemoglobin 8.1 g/dL      Hematocrit 22.7 %      MCV 89.7 fL      MCH 32.0 pg      MCHC 35.7 g/dL      RDW 12.7 %      RDW-SD 41.3 fl      MPV 9.6 fL      Platelets 188 10*3/mm3     TISSUE EXAM, [facility] ([facility]) [[id number]] Collected: 11/19/24 1656    Specimen:  Tissue Updated: 11/19/24 1803    Hemoglobin & Hematocrit, Blood [[id number]]  (Abnormal) Collected: 11/19/24 1643    Specimen: Blood Updated: 11/19/24 1647     Hemoglobin 10.1 g/dL      Hematocrit 30.2 %     Treponema pallidum AB w/Reflex RPR [[id number]]  (Normal) Collected: 11/19/24 0629    Specimen: Blood Updated: 11/19/24 1226     Treponemal AB Total Non-Reactive    Narrative:      Reactive results will reflex RPR testing.          Imaging Results (Last 24 Hours)       ** No results found for the last 24 hours. **          Prenatal Labs:  Lab Results   Component Value Date    RUBELLAABIGG 1.73 05/07/2024    RH Positive 11/19/2024    HEPBSAG Negative 05/07/2024     Assessment   PPD #1 S/P vaginal delivery/manual extraction of placenta  PP hemorrhage  Acute blood loss  Uterine tenderness     Plan   Dill catheter discontinued.  Patient to ambulate with caution and assistance.  Will give 2 g of Ancef given manual extraction of placenta and positive uterine tenderness.  Awaiting results of second blood test.  Will start iron supplements.  Probable transfer to floor later this morning.    Lupe Ding M.D.  11/20/2024  08:44 EST

## 2024-11-20 NOTE — PLAN OF CARE
Goal Outcome Evaluation:          of viable male infant. Shoulder dystocia. Postpartum hemorrhage. Patient remaining on labor for further monitoring. Patient receiving one unit of blood, with repeat cbc 4 hours post. Patient stable. Report given to GUI Freedman.

## 2024-11-21 VITALS
BODY MASS INDEX: 28.14 KG/M2 | TEMPERATURE: 98.1 F | SYSTOLIC BLOOD PRESSURE: 99 MMHG | HEIGHT: 71 IN | WEIGHT: 201 LBS | RESPIRATION RATE: 16 BRPM | HEART RATE: 82 BPM | OXYGEN SATURATION: 96 % | DIASTOLIC BLOOD PRESSURE: 58 MMHG

## 2024-11-21 LAB
BH BB BLOOD EXPIRATION DATE: NORMAL
BH BB BLOOD EXPIRATION DATE: NORMAL
BH BB BLOOD TYPE BARCODE: 5100
BH BB BLOOD TYPE BARCODE: 5100
BH BB DISPENSE STATUS: NORMAL
BH BB DISPENSE STATUS: NORMAL
BH BB PRODUCT CODE: NORMAL
BH BB PRODUCT CODE: NORMAL
BH BB UNIT NUMBER: NORMAL
BH BB UNIT NUMBER: NORMAL
CROSSMATCH INTERPRETATION: NORMAL
CROSSMATCH INTERPRETATION: NORMAL
REF LAB TEST METHOD: NORMAL
UNIT  ABO: NORMAL
UNIT  ABO: NORMAL
UNIT  RH: NORMAL
UNIT  RH: NORMAL

## 2024-11-21 RX ORDER — FERROUS SULFATE 324(65)MG
324 TABLET, DELAYED RELEASE (ENTERIC COATED) ORAL 2 TIMES DAILY WITH MEALS
Qty: 60 TABLET | Refills: 1 | Status: SHIPPED | OUTPATIENT
Start: 2024-11-21

## 2024-11-21 RX ORDER — IBUPROFEN 600 MG/1
600 TABLET, FILM COATED ORAL EVERY 6 HOURS PRN
Qty: 60 TABLET | Refills: 0 | Status: SHIPPED | OUTPATIENT
Start: 2024-11-21

## 2024-11-21 RX ADMIN — DOCUSATE SODIUM 100 MG: 100 CAPSULE, LIQUID FILLED ORAL at 07:41

## 2024-11-21 RX ADMIN — PRENATAL VITAMINS-IRON FUMARATE 27 MG IRON-FOLIC ACID 0.8 MG TABLET 1 TABLET: at 07:41

## 2024-11-21 RX ADMIN — IBUPROFEN 600 MG: 600 TABLET ORAL at 07:41

## 2024-11-21 RX ADMIN — FERROUS SULFATE TAB EC 324 MG (65 MG FE EQUIVALENT) 324 MG: 324 (65 FE) TABLET DELAYED RESPONSE at 07:41

## 2024-11-21 NOTE — DISCHARGE SUMMARY
Discharge Summary     Isiah  Bel Brown  : 1985  MRN: [id number]  CSN: [id number]    Date of Admission: 2024   Date of Discharge:  2024   Delivering Physician: Linda Wood       Admission Diagnosis: Encounter for induction of labor [Z34.90]   Discharge Diagnosis: Same as above plus  Pregnancy at 40w0d - delivered       Procedures: 2024 - Vaginal, Spontaneous   2024 - Postpartum Hemorrhage, immediate     Hospital Course  Patient is a 39 y.o.  who at 40w0d had a vaginal birth.  Her postpartum course she received 2 units PRBCs.  On PPD #2 she was ready for discharge.  She had normal lochia and pain well controlled with oral medications. She is bottle feeding. Partner considering vasectomy.    Exam  General: no acute distress  Resp: unlabored  Abdomen: FF  Extremities: no edema    Infant  male fetus weighing 4394 g (9 lb 11 oz)  Apgars -  9 @ 1 minute /  10 @ 5 minutes.    Discharge labs  Lab Results   Component Value Date    WBC 12.16 (H) 2024    HGB 8.2 (L) 2024    HCT 22.8 (L) 2024     (L) 2024       Discharge Medications     Discharge Medications        New Medications        Instructions Start Date   ferrous sulfate 324 (65 Fe) MG tablet delayed-release EC tablet   324 mg, Oral, 2 Times Daily With Meals      ibuprofen 600 MG tablet  Commonly known as: ADVIL,MOTRIN   600 mg, Oral, Every 6 Hours PRN             Continue These Medications        Instructions Start Date   MIRALAX PO   Take  by mouth.      PRENATAL GUMMY VITAMIN   1 each, Daily      ProAir  (90 Base) MCG/ACT inhaler  Generic drug: albuterol sulfate HFA   inhale 2 puffs by mouth every 6 hours if needed for cough or wheezing               Discharge Disposition Home or Self Care   Condition on Discharge: good   Follow-up: 1 week with [facility] for repeat CBC     Time spent on discharge: 30 minutes or less  Linda Wood, APRHOLDEN  2024

## 2024-11-21 NOTE — PROGRESS NOTES
Patient: Bel Brown  * No surgery found *  Anesthesia type: No value filed.    Patient location: Labor and Delivery  Last vitals:   Vitals:    11/20/24 1900   BP: 106/69   Pulse: 83   Resp: 18   Temp: 97.9 °F (36.6 °C)   SpO2: 96%     Level of consciousness: awake, alert, and oriented    Post-anesthesia pain: adequate analgesia  Airway patency: patent  Respiratory: unassisted  Cardiovascular: stable and blood pressure at baseline  Hydration: euvolemic    Anesthetic complications: no

## 2024-11-21 NOTE — PLAN OF CARE
Goal Outcome Evaluation:  Plan of Care Reviewed With: patient        Progress: improving  Outcome Evaluation: VSS, adequate pain relief,bleeding WNL, ambulating, bottlefeeding, + bonding noted,H&H stable,anticipating discharge

## 2024-11-21 NOTE — PLAN OF CARE
Goal Outcome Evaluation:  Plan of Care Reviewed With: patient           Outcome Evaluation: Discharged home with . VSS. Adequate pain contril. Ambulation encouraged and observed with no adverse symptoms. Bottle feeding . Positive bonding observed.

## 2024-12-02 ENCOUNTER — LAB (OUTPATIENT)
Dept: LAB | Facility: HOSPITAL | Age: 39
End: 2024-12-02
Payer: COMMERCIAL

## 2024-12-02 ENCOUNTER — TELEPHONE (OUTPATIENT)
Dept: OBSTETRICS AND GYNECOLOGY | Facility: CLINIC | Age: 39
End: 2024-12-02
Payer: COMMERCIAL

## 2024-12-02 LAB
BASOPHILS # BLD AUTO: 0.07 10*3/MM3 (ref 0–0.2)
BASOPHILS NFR BLD AUTO: 0.9 % (ref 0–1.5)
DEPRECATED RDW RBC AUTO: 41.5 FL (ref 37–54)
EOSINOPHIL # BLD AUTO: 0.18 10*3/MM3 (ref 0–0.4)
EOSINOPHIL NFR BLD AUTO: 2.2 % (ref 0.3–6.2)
ERYTHROCYTE [DISTWIDTH] IN BLOOD BY AUTOMATED COUNT: 12.6 % (ref 12.3–15.4)
HCT VFR BLD AUTO: 31.1 % (ref 34–46.6)
HGB BLD-MCNC: 10.1 G/DL (ref 12–15.9)
IMM GRANULOCYTES # BLD AUTO: 0.03 10*3/MM3 (ref 0–0.05)
IMM GRANULOCYTES NFR BLD AUTO: 0.4 % (ref 0–0.5)
LYMPHOCYTES # BLD AUTO: 1.65 10*3/MM3 (ref 0.7–3.1)
LYMPHOCYTES NFR BLD AUTO: 20.3 % (ref 19.6–45.3)
MCH RBC QN AUTO: 29.5 PG (ref 26.6–33)
MCHC RBC AUTO-ENTMCNC: 32.5 G/DL (ref 31.5–35.7)
MCV RBC AUTO: 90.9 FL (ref 79–97)
MONOCYTES # BLD AUTO: 0.62 10*3/MM3 (ref 0.1–0.9)
MONOCYTES NFR BLD AUTO: 7.6 % (ref 5–12)
NEUTROPHILS NFR BLD AUTO: 5.56 10*3/MM3 (ref 1.7–7)
NEUTROPHILS NFR BLD AUTO: 68.6 % (ref 42.7–76)
NRBC BLD AUTO-RTO: 0 /100 WBC (ref 0–0.2)
PLATELET # BLD AUTO: 325 10*3/MM3 (ref 140–450)
PMV BLD AUTO: 9 FL (ref 6–12)
RBC # BLD AUTO: 3.42 10*6/MM3 (ref 3.77–5.28)
WBC NRBC COR # BLD AUTO: 8.11 10*3/MM3 (ref 3.4–10.8)

## 2024-12-02 PROCEDURE — 36415 COLL VENOUS BLD VENIPUNCTURE: CPT

## 2024-12-02 PROCEDURE — 85025 COMPLETE CBC W/AUTO DIFF WBC: CPT

## 2024-12-02 NOTE — TELEPHONE ENCOUNTER
Caller: Bel Brown    Relationship: Self    Best call back number: [phone]    What orders are you requesting (i.e. lab or imaging): LABS- CBC    In what timeframe would the patient need to come in: TODAY    Where will you receive your lab/imaging services: [facility]    Additional notes: CALLED NOT ORDERS ON CHART WOULD LIKE TO GO IN AN HOUR

## 2024-12-03 ENCOUNTER — MATERNAL SCREENING (OUTPATIENT)
Dept: CALL CENTER | Facility: HOSPITAL | Age: 39
End: 2024-12-03
Payer: COMMERCIAL

## 2024-12-03 NOTE — OUTREACH NOTE
Maternal Screening Survey      Flowsheet Row Responses   Facility patient discharged from? [facility]   Attempt successful? Yes   Call start time 1443   Call end time 1446   I have been able to laugh and see the funny side of things. 0   I have looked forward with enjoyment to things. 0   I have blamed myself unnecessarily when things went wrong. 0   I have been anxious or worried for no good reason. 1   I have felt scared or panicky for no good reason. 1   Things have been getting on top of me. 0   I have been so unhappy that I have had difficulty sleeping. 0   I have felt sad or miserable. 0   I have been so unhappy that I have been crying. 0   The thought of harming myself has occurred to me. 0   [place]  Depression Scale Total 2   Did any of your parents have problems with alcohol or drug use? No   Do any of your peers have problems with alcohol or drug use? No   Does your partner have problems with alcohol or drug use? No   Before you were pregnant did you have problems with alcohol or drug use? (past) No   In the past month, did you drink beer, wine, liquor or use any other drugs? (pregnancy) No   Maternal Screening call completed Yes   Call end time 1446              ASHLIE VALE - Registered Nurse